# Patient Record
Sex: MALE | Race: WHITE | NOT HISPANIC OR LATINO | URBAN - METROPOLITAN AREA
[De-identification: names, ages, dates, MRNs, and addresses within clinical notes are randomized per-mention and may not be internally consistent; named-entity substitution may affect disease eponyms.]

---

## 2017-01-23 ENCOUNTER — OUTPATIENT (OUTPATIENT)
Dept: OUTPATIENT SERVICES | Facility: HOSPITAL | Age: 27
LOS: 1 days | End: 2017-01-23

## 2017-01-23 ENCOUNTER — APPOINTMENT (OUTPATIENT)
Dept: NEUROLOGY | Facility: HOSPITAL | Age: 27
End: 2017-01-23

## 2017-01-23 VITALS
BODY MASS INDEX: 25.03 KG/M2 | HEIGHT: 69 IN | SYSTOLIC BLOOD PRESSURE: 123 MMHG | DIASTOLIC BLOOD PRESSURE: 77 MMHG | WEIGHT: 169 LBS | HEART RATE: 69 BPM

## 2017-01-25 DIAGNOSIS — R56.9 UNSPECIFIED CONVULSIONS: ICD-10-CM

## 2017-10-03 ENCOUNTER — MEDICATION RENEWAL (OUTPATIENT)
Age: 27
End: 2017-10-03

## 2017-10-09 ENCOUNTER — APPOINTMENT (OUTPATIENT)
Dept: NEUROLOGY | Facility: HOSPITAL | Age: 27
End: 2017-10-09

## 2017-10-09 ENCOUNTER — OUTPATIENT (OUTPATIENT)
Dept: OUTPATIENT SERVICES | Facility: HOSPITAL | Age: 27
LOS: 1 days | End: 2017-10-09

## 2017-10-09 VITALS
BODY MASS INDEX: 24.59 KG/M2 | SYSTOLIC BLOOD PRESSURE: 123 MMHG | HEART RATE: 65 BPM | HEIGHT: 69 IN | DIASTOLIC BLOOD PRESSURE: 80 MMHG | WEIGHT: 166 LBS

## 2017-10-10 DIAGNOSIS — G40.909 EPILEPSY, UNSPECIFIED, NOT INTRACTABLE, WITHOUT STATUS EPILEPTICUS: ICD-10-CM

## 2017-10-10 DIAGNOSIS — R56.9 UNSPECIFIED CONVULSIONS: ICD-10-CM

## 2018-04-09 ENCOUNTER — LABORATORY RESULT (OUTPATIENT)
Age: 28
End: 2018-04-09

## 2018-04-09 ENCOUNTER — APPOINTMENT (OUTPATIENT)
Dept: NEUROLOGY | Facility: HOSPITAL | Age: 28
End: 2018-04-09

## 2018-04-09 ENCOUNTER — OUTPATIENT (OUTPATIENT)
Dept: OUTPATIENT SERVICES | Facility: HOSPITAL | Age: 28
LOS: 1 days | End: 2018-04-09

## 2018-04-09 VITALS
HEART RATE: 78 BPM | HEIGHT: 69 IN | DIASTOLIC BLOOD PRESSURE: 64 MMHG | WEIGHT: 161 LBS | SYSTOLIC BLOOD PRESSURE: 112 MMHG | BODY MASS INDEX: 23.85 KG/M2

## 2018-04-09 LAB
ALBUMIN SERPL ELPH-MCNC: 4.2 G/DL — SIGNIFICANT CHANGE UP (ref 3.3–5)
ALP SERPL-CCNC: 45 U/L — SIGNIFICANT CHANGE UP (ref 40–120)
ALT FLD-CCNC: 13 U/L — SIGNIFICANT CHANGE UP (ref 4–41)
AST SERPL-CCNC: 20 U/L — SIGNIFICANT CHANGE UP (ref 4–40)
BASOPHILS # BLD AUTO: 0.02 K/UL — SIGNIFICANT CHANGE UP (ref 0–0.2)
BASOPHILS NFR BLD AUTO: 0.3 % — SIGNIFICANT CHANGE UP (ref 0–2)
BILIRUB DIRECT SERPL-MCNC: 0.2 MG/DL — SIGNIFICANT CHANGE UP (ref 0.1–0.2)
BILIRUB SERPL-MCNC: 0.7 MG/DL — SIGNIFICANT CHANGE UP (ref 0.2–1.2)
BUN SERPL-MCNC: 17 MG/DL — SIGNIFICANT CHANGE UP (ref 7–23)
CALCIUM SERPL-MCNC: 9.5 MG/DL — SIGNIFICANT CHANGE UP (ref 8.4–10.5)
CHLORIDE SERPL-SCNC: 103 MMOL/L — SIGNIFICANT CHANGE UP (ref 98–107)
CO2 SERPL-SCNC: 27 MMOL/L — SIGNIFICANT CHANGE UP (ref 22–31)
CREAT SERPL-MCNC: 1.24 MG/DL — SIGNIFICANT CHANGE UP (ref 0.5–1.3)
EOSINOPHIL # BLD AUTO: 0.19 K/UL — SIGNIFICANT CHANGE UP (ref 0–0.5)
EOSINOPHIL NFR BLD AUTO: 2.6 % — SIGNIFICANT CHANGE UP (ref 0–6)
GLUCOSE SERPL-MCNC: 73 MG/DL — SIGNIFICANT CHANGE UP (ref 70–99)
HCT VFR BLD CALC: 49.4 % — SIGNIFICANT CHANGE UP (ref 39–50)
HGB BLD-MCNC: 15.8 G/DL — SIGNIFICANT CHANGE UP (ref 13–17)
IMM GRANULOCYTES # BLD AUTO: 0.03 # — SIGNIFICANT CHANGE UP
IMM GRANULOCYTES NFR BLD AUTO: 0.4 % — SIGNIFICANT CHANGE UP (ref 0–1.5)
LYMPHOCYTES # BLD AUTO: 1.59 K/UL — SIGNIFICANT CHANGE UP (ref 1–3.3)
LYMPHOCYTES # BLD AUTO: 21.7 % — SIGNIFICANT CHANGE UP (ref 13–44)
MCHC RBC-ENTMCNC: 28.8 PG — SIGNIFICANT CHANGE UP (ref 27–34)
MCHC RBC-ENTMCNC: 32 % — SIGNIFICANT CHANGE UP (ref 32–36)
MCV RBC AUTO: 90.1 FL — SIGNIFICANT CHANGE UP (ref 80–100)
MONOCYTES # BLD AUTO: 0.55 K/UL — SIGNIFICANT CHANGE UP (ref 0–0.9)
MONOCYTES NFR BLD AUTO: 7.5 % — SIGNIFICANT CHANGE UP (ref 2–14)
NEUTROPHILS # BLD AUTO: 4.95 K/UL — SIGNIFICANT CHANGE UP (ref 1.8–7.4)
NEUTROPHILS NFR BLD AUTO: 67.5 % — SIGNIFICANT CHANGE UP (ref 43–77)
NRBC # FLD: 0 — SIGNIFICANT CHANGE UP
PLATELET # BLD AUTO: 207 K/UL — SIGNIFICANT CHANGE UP (ref 150–400)
PMV BLD: 11.1 FL — SIGNIFICANT CHANGE UP (ref 7–13)
POTASSIUM SERPL-MCNC: 4.7 MMOL/L — SIGNIFICANT CHANGE UP (ref 3.5–5.3)
POTASSIUM SERPL-SCNC: 4.7 MMOL/L — SIGNIFICANT CHANGE UP (ref 3.5–5.3)
PROT SERPL-MCNC: 6.8 G/DL — SIGNIFICANT CHANGE UP (ref 6–8.3)
RBC # BLD: 5.48 M/UL — SIGNIFICANT CHANGE UP (ref 4.2–5.8)
RBC # FLD: 12.6 % — SIGNIFICANT CHANGE UP (ref 10.3–14.5)
SODIUM SERPL-SCNC: 144 MMOL/L — SIGNIFICANT CHANGE UP (ref 135–145)
WBC # BLD: 7.33 K/UL — SIGNIFICANT CHANGE UP (ref 3.8–10.5)
WBC # FLD AUTO: 7.33 K/UL — SIGNIFICANT CHANGE UP (ref 3.8–10.5)

## 2018-04-10 DIAGNOSIS — G40.209 LOCALIZATION-RELATED (FOCAL) (PARTIAL) SYMPTOMATIC EPILEPSY AND EPILEPTIC SYNDROMES WITH COMPLEX PARTIAL SEIZURES, NOT INTRACTABLE, WITHOUT STATUS EPILEPTICUS: ICD-10-CM

## 2018-10-15 ENCOUNTER — OUTPATIENT (OUTPATIENT)
Dept: OUTPATIENT SERVICES | Facility: HOSPITAL | Age: 28
LOS: 1 days | End: 2018-10-15

## 2018-10-15 ENCOUNTER — APPOINTMENT (OUTPATIENT)
Dept: NEUROLOGY | Facility: HOSPITAL | Age: 28
End: 2018-10-15

## 2018-10-15 VITALS
DIASTOLIC BLOOD PRESSURE: 75 MMHG | HEIGHT: 69 IN | HEART RATE: 67 BPM | SYSTOLIC BLOOD PRESSURE: 125 MMHG | WEIGHT: 157 LBS | BODY MASS INDEX: 23.25 KG/M2

## 2018-10-15 DIAGNOSIS — R56.9 UNSPECIFIED CONVULSIONS: ICD-10-CM

## 2018-10-16 DIAGNOSIS — R56.9 UNSPECIFIED CONVULSIONS: ICD-10-CM

## 2018-11-13 ENCOUNTER — APPOINTMENT (OUTPATIENT)
Dept: INTERNAL MEDICINE | Facility: CLINIC | Age: 28
End: 2018-11-13

## 2018-12-16 ENCOUNTER — FORM ENCOUNTER (OUTPATIENT)
Age: 28
End: 2018-12-16

## 2018-12-17 ENCOUNTER — OUTPATIENT (OUTPATIENT)
Dept: OUTPATIENT SERVICES | Facility: HOSPITAL | Age: 28
LOS: 1 days | End: 2018-12-17
Payer: COMMERCIAL

## 2018-12-17 ENCOUNTER — APPOINTMENT (OUTPATIENT)
Dept: ULTRASOUND IMAGING | Facility: IMAGING CENTER | Age: 28
End: 2018-12-17
Payer: COMMERCIAL

## 2018-12-17 ENCOUNTER — APPOINTMENT (OUTPATIENT)
Dept: UROLOGY | Facility: CLINIC | Age: 28
End: 2018-12-17
Payer: COMMERCIAL

## 2018-12-17 ENCOUNTER — OTHER (OUTPATIENT)
Age: 28
End: 2018-12-17

## 2018-12-17 DIAGNOSIS — R39.9 UNSPECIFIED SYMPTOMS AND SIGNS INVOLVING THE GENITOURINARY SYSTEM: ICD-10-CM

## 2018-12-17 DIAGNOSIS — N50.9 DISORDER OF MALE GENITAL ORGANS, UNSPECIFIED: ICD-10-CM

## 2018-12-17 PROCEDURE — 76870 US EXAM SCROTUM: CPT | Mod: 26

## 2018-12-17 PROCEDURE — 76870 US EXAM SCROTUM: CPT

## 2018-12-17 PROCEDURE — 99204 OFFICE O/P NEW MOD 45 MIN: CPT

## 2018-12-19 LAB
APPEARANCE: CLEAR
BACTERIA UR CULT: NORMAL
BACTERIA: NEGATIVE
BILIRUBIN URINE: NEGATIVE
BLOOD URINE: NEGATIVE
COLOR: YELLOW
GLUCOSE QUALITATIVE U: NEGATIVE MG/DL
HYALINE CASTS: 0 /LPF
KETONES URINE: NEGATIVE
LEUKOCYTE ESTERASE URINE: NEGATIVE
MICROSCOPIC-UA: NORMAL
NITRITE URINE: NEGATIVE
PH URINE: 6.5
PROTEIN URINE: NEGATIVE MG/DL
RED BLOOD CELLS URINE: 0 /HPF
SPECIFIC GRAVITY URINE: 1.01
SQUAMOUS EPITHELIAL CELLS: 0 /HPF
UROBILINOGEN URINE: NEGATIVE MG/DL
WHITE BLOOD CELLS URINE: 0 /HPF

## 2019-06-05 ENCOUNTER — RX RENEWAL (OUTPATIENT)
Age: 29
End: 2019-06-05

## 2019-08-01 ENCOUNTER — OUTPATIENT (OUTPATIENT)
Dept: OUTPATIENT SERVICES | Facility: HOSPITAL | Age: 29
LOS: 1 days | End: 2019-08-01
Payer: MEDICAID

## 2019-08-01 ENCOUNTER — APPOINTMENT (OUTPATIENT)
Dept: NEUROLOGY | Facility: HOSPITAL | Age: 29
End: 2019-08-01

## 2019-08-01 VITALS
HEIGHT: 69 IN | SYSTOLIC BLOOD PRESSURE: 114 MMHG | WEIGHT: 165 LBS | RESPIRATION RATE: 14 BRPM | BODY MASS INDEX: 24.44 KG/M2 | DIASTOLIC BLOOD PRESSURE: 70 MMHG | HEART RATE: 62 BPM

## 2019-08-01 DIAGNOSIS — G40.909 EPILEPSY, UNSPECIFIED, NOT INTRACTABLE, WITHOUT STATUS EPILEPTICUS: ICD-10-CM

## 2019-08-01 DIAGNOSIS — R56.9 UNSPECIFIED CONVULSIONS: ICD-10-CM

## 2019-08-01 LAB
ANION GAP SERPL CALC-SCNC: 13 MMOL/L — SIGNIFICANT CHANGE UP (ref 5–17)
BASOPHILS # BLD AUTO: 0.04 K/UL — SIGNIFICANT CHANGE UP (ref 0–0.2)
BASOPHILS NFR BLD AUTO: 0.5 % — SIGNIFICANT CHANGE UP (ref 0–2)
BUN SERPL-MCNC: 17 MG/DL — SIGNIFICANT CHANGE UP (ref 7–23)
CALCIUM SERPL-MCNC: 10.2 MG/DL — SIGNIFICANT CHANGE UP (ref 8.4–10.5)
CHLORIDE SERPL-SCNC: 104 MMOL/L — SIGNIFICANT CHANGE UP (ref 96–108)
CO2 SERPL-SCNC: 29 MMOL/L — SIGNIFICANT CHANGE UP (ref 22–31)
CREAT SERPL-MCNC: 1.31 MG/DL — HIGH (ref 0.5–1.3)
EOSINOPHIL # BLD AUTO: 0.15 K/UL — SIGNIFICANT CHANGE UP (ref 0–0.5)
EOSINOPHIL NFR BLD AUTO: 1.9 % — SIGNIFICANT CHANGE UP (ref 0–6)
GLUCOSE SERPL-MCNC: 78 MG/DL — SIGNIFICANT CHANGE UP (ref 70–99)
HCT VFR BLD CALC: 46.8 % — SIGNIFICANT CHANGE UP (ref 39–50)
HGB BLD-MCNC: 15.8 G/DL — SIGNIFICANT CHANGE UP (ref 13–17)
IMM GRANULOCYTES NFR BLD AUTO: 0.4 % — SIGNIFICANT CHANGE UP (ref 0–1.5)
LYMPHOCYTES # BLD AUTO: 2.48 K/UL — SIGNIFICANT CHANGE UP (ref 1–3.3)
LYMPHOCYTES # BLD AUTO: 31.8 % — SIGNIFICANT CHANGE UP (ref 13–44)
MCHC RBC-ENTMCNC: 30 PG — SIGNIFICANT CHANGE UP (ref 27–34)
MCHC RBC-ENTMCNC: 33.8 GM/DL — SIGNIFICANT CHANGE UP (ref 32–36)
MCV RBC AUTO: 88.8 FL — SIGNIFICANT CHANGE UP (ref 80–100)
MONOCYTES # BLD AUTO: 0.68 K/UL — SIGNIFICANT CHANGE UP (ref 0–0.9)
MONOCYTES NFR BLD AUTO: 8.7 % — SIGNIFICANT CHANGE UP (ref 2–14)
NEUTROPHILS # BLD AUTO: 4.41 K/UL — SIGNIFICANT CHANGE UP (ref 1.8–7.4)
NEUTROPHILS NFR BLD AUTO: 56.7 % — SIGNIFICANT CHANGE UP (ref 43–77)
PLATELET # BLD AUTO: 217 K/UL — SIGNIFICANT CHANGE UP (ref 150–400)
POTASSIUM SERPL-MCNC: 4.8 MMOL/L — SIGNIFICANT CHANGE UP (ref 3.5–5.3)
POTASSIUM SERPL-SCNC: 4.8 MMOL/L — SIGNIFICANT CHANGE UP (ref 3.5–5.3)
RBC # BLD: 5.27 M/UL — SIGNIFICANT CHANGE UP (ref 4.2–5.8)
RBC # FLD: 12.7 % — SIGNIFICANT CHANGE UP (ref 10.3–14.5)
SODIUM SERPL-SCNC: 146 MMOL/L — HIGH (ref 135–145)
WBC # BLD: 7.79 K/UL — SIGNIFICANT CHANGE UP (ref 3.8–10.5)
WBC # FLD AUTO: 7.79 K/UL — SIGNIFICANT CHANGE UP (ref 3.8–10.5)

## 2019-08-01 PROCEDURE — 80177 DRUG SCRN QUAN LEVETIRACETAM: CPT

## 2019-08-01 PROCEDURE — 80048 BASIC METABOLIC PNL TOTAL CA: CPT

## 2019-08-01 PROCEDURE — G0463: CPT

## 2019-08-01 PROCEDURE — 36415 COLL VENOUS BLD VENIPUNCTURE: CPT

## 2019-08-01 NOTE — HISTORY OF PRESENT ILLNESS
[FreeTextEntry1] : 28 yo male presents for f/u epilepsy clinic. Patient last seen 6 months ago. Today reports he is doing well. No complaints. Complies with medication. Doing well on Keppra 1500mg po bid. Patient driving, working. Seizure precautions discussed. \par \par Last GTC was reported to be in . \par \par Handedness: The patient is right handed. \par Age of Onset: 24-25 yrs of age. \par Seizure / Event Types: Generalized convulsions. Tongue biting with incontinence. \par History of Status Epilepticus: no. \par Epilepsy Risk Factors: no  complications, no febrile seizures, no developmental delay, no head trauma, no meningitis or encephalitis, no stroke and no family history of seizures \par Past treatment has included Only Keppra\par \par Previous hx:\par 27yr M with history of 3 GTC in total- 2 in 2014, followed by 1 in 2014. He underwent an inpatient VEEG which revealed only mild generalized slowing and an MRI Brain w/wo MRA head and neck both in  and  which reveal a subcentimeter area in right frontal lobe that may be a DVA vs Cavernoma. He has been tolerating Keppra 1g BID and has not had an event over 2 years. However in 2016 he reported a GTC episodes preceded by his "right eye spinning around". Keppra increased to 1500 mg BID\par \par MRI Brain showed blush of stable enhancement in right frontal lesion with hemosiderin deposition. \par Routine EEG showed ?left temporal theta activity.

## 2019-08-01 NOTE — DISCUSSION/SUMMARY
[FreeTextEntry1] : 30 yo male with GTCs, ?likely focal epilepsy. ( first seizure 2014)\par EEG with ? 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

## 2019-08-01 NOTE — PHYSICAL EXAM
[FreeTextEntry1] : General: NAD, normal appearance, healthy appearing, pleasant, cooperative, alert\par \par Neuro: \par MS: AA0x3, follows commands, fluent speech, no dysarthria, no neglect.\par CN: PERRL, EOMI, VFF, V1-V3 intact, no facial asymmetry, uvula/tongue midline, SCM's/traps intact.\par Motor: 5/5 all, no drifts\par Sensory: Intact LT all\par Reflexes: DTR's all 2+ and symmetric\par Coordination: No dysmetria FTN b/l\par Gait: intact and steady

## 2019-08-03 LAB — LEVETIRACETAM SERPL-MCNC: 34.2 MCG/ML — SIGNIFICANT CHANGE UP (ref 12–46)

## 2019-09-16 ENCOUNTER — OUTPATIENT (OUTPATIENT)
Dept: OUTPATIENT SERVICES | Facility: HOSPITAL | Age: 29
LOS: 1 days | End: 2019-09-16
Payer: MEDICAID

## 2019-09-16 ENCOUNTER — APPOINTMENT (OUTPATIENT)
Dept: UROLOGY | Facility: HOSPITAL | Age: 29
End: 2019-09-16

## 2019-09-16 VITALS
DIASTOLIC BLOOD PRESSURE: 83 MMHG | BODY MASS INDEX: 24.59 KG/M2 | RESPIRATION RATE: 14 BRPM | HEART RATE: 69 BPM | HEIGHT: 69 IN | WEIGHT: 166 LBS | SYSTOLIC BLOOD PRESSURE: 121 MMHG

## 2019-09-16 DIAGNOSIS — N52.9 MALE ERECTILE DYSFUNCTION, UNSPECIFIED: ICD-10-CM

## 2019-09-16 DIAGNOSIS — R30.0 DYSURIA: ICD-10-CM

## 2019-09-16 PROCEDURE — G0463: CPT

## 2019-09-16 RX ORDER — TADALAFIL 20 MG/1
20 TABLET ORAL
Qty: 30 | Refills: 0 | Status: DISCONTINUED | COMMUNITY
Start: 2019-09-16 | End: 2019-09-16

## 2019-09-16 NOTE — PHYSICAL EXAM
[General Appearance - Well Developed] : well developed [General Appearance - Well Nourished] : well nourished [Well Groomed] : well groomed [Normal Appearance] : normal appearance [General Appearance - In No Acute Distress] : no acute distress [Abdomen Soft] : soft [Abdomen Tenderness] : non-tender [Costovertebral Angle Tenderness] : no ~M costovertebral angle tenderness [Penis Abnormality] : normal uncircumcised penis [Urinary Bladder Findings] : the bladder was normal on palpation [Scrotum] : the scrotum was normal [Testes Mass (___cm)] : there were no testicular masses [No Prostate Nodules] : no prostate nodules [Edema] : no peripheral edema [Respiration, Rhythm And Depth] : normal respiratory rhythm and effort [] : no respiratory distress [Exaggerated Use Of Accessory Muscles For Inspiration] : no accessory muscle use [Oriented To Time, Place, And Person] : oriented to person, place, and time [Affect] : the affect was normal [Mood] : the mood was normal [Not Anxious] : not anxious [Normal Station and Gait] : the gait and station were normal for the patient's age [No Focal Deficits] : no focal deficits [No Palpable Adenopathy] : no palpable adenopathy

## 2019-09-16 NOTE — HISTORY OF PRESENT ILLNESS
[Erectile Dysfunction] : Erectile Dysfunction [FreeTextEntry1] : 29 year old male with history of ED for follow up.  He has been worked up in the past. Last testosterone 850.  He states he often cannot stay fully erect.  He has taken Cialis in the past which he states has worked well, and he would like a new Rx.  Denies anxiety or depression.  He has a history of seizure disorder and he takes Keppra.  He is sexually active with one partner, no history of STDs.  He underwent a hydrocelectomy in the past, denies any testicular pain.

## 2019-12-09 ENCOUNTER — APPOINTMENT (OUTPATIENT)
Dept: UROLOGY | Facility: HOSPITAL | Age: 29
End: 2019-12-09

## 2019-12-16 ENCOUNTER — APPOINTMENT (OUTPATIENT)
Dept: UROLOGY | Facility: HOSPITAL | Age: 29
End: 2019-12-16

## 2019-12-16 ENCOUNTER — OUTPATIENT (OUTPATIENT)
Dept: OUTPATIENT SERVICES | Facility: HOSPITAL | Age: 29
LOS: 1 days | End: 2019-12-16
Payer: MEDICAID

## 2019-12-16 VITALS
SYSTOLIC BLOOD PRESSURE: 118 MMHG | BODY MASS INDEX: 24.44 KG/M2 | WEIGHT: 165 LBS | RESPIRATION RATE: 14 BRPM | HEART RATE: 72 BPM | HEIGHT: 69 IN | DIASTOLIC BLOOD PRESSURE: 74 MMHG

## 2019-12-16 DIAGNOSIS — R30.0 DYSURIA: ICD-10-CM

## 2019-12-16 LAB
APPEARANCE UR: CLEAR — SIGNIFICANT CHANGE UP
BACTERIA # UR AUTO: NEGATIVE — SIGNIFICANT CHANGE UP
BILIRUB UR-MCNC: NEGATIVE — SIGNIFICANT CHANGE UP
COLOR SPEC: YELLOW — SIGNIFICANT CHANGE UP
DIFF PNL FLD: NEGATIVE — SIGNIFICANT CHANGE UP
EPI CELLS # UR: 0 /HPF — SIGNIFICANT CHANGE UP (ref 0–5)
GLUCOSE UR QL: NEGATIVE — SIGNIFICANT CHANGE UP
HYALINE CASTS # UR AUTO: 0 /LPF — SIGNIFICANT CHANGE UP (ref 0–7)
KETONES UR-MCNC: NEGATIVE — SIGNIFICANT CHANGE UP
LEUKOCYTE ESTERASE UR-ACNC: NEGATIVE — SIGNIFICANT CHANGE UP
NITRITE UR-MCNC: NEGATIVE — SIGNIFICANT CHANGE UP
PH UR: 6 — SIGNIFICANT CHANGE UP (ref 5–8)
PROT UR-MCNC: SIGNIFICANT CHANGE UP
RBC CASTS # UR COMP ASSIST: 3 /HPF — SIGNIFICANT CHANGE UP (ref 0–4)
SP GR SPEC: 1.03 — HIGH (ref 1.01–1.02)
UROBILINOGEN FLD QL: SIGNIFICANT CHANGE UP
WBC UR QL: 1 /HPF — SIGNIFICANT CHANGE UP (ref 0–5)

## 2019-12-16 PROCEDURE — G0463: CPT

## 2019-12-16 PROCEDURE — 87086 URINE CULTURE/COLONY COUNT: CPT

## 2019-12-16 PROCEDURE — 81001 URINALYSIS AUTO W/SCOPE: CPT

## 2019-12-16 NOTE — PHYSICAL EXAM
[General Appearance - Well Developed] : well developed [General Appearance - Well Nourished] : well nourished [Well Groomed] : well groomed [Normal Appearance] : normal appearance [General Appearance - In No Acute Distress] : no acute distress [Abdomen Soft] : soft [Abdomen Tenderness] : non-tender [Costovertebral Angle Tenderness] : no ~M costovertebral angle tenderness [Penis Abnormality] : normal uncircumcised penis [Scrotum] : the scrotum was normal [Urinary Bladder Findings] : the bladder was normal on palpation [No Prostate Nodules] : no prostate nodules [Testes Mass (___cm)] : there were no testicular masses [Edema] : no peripheral edema [] : no respiratory distress [Respiration, Rhythm And Depth] : normal respiratory rhythm and effort [Exaggerated Use Of Accessory Muscles For Inspiration] : no accessory muscle use [Oriented To Time, Place, And Person] : oriented to person, place, and time [Affect] : the affect was normal [Mood] : the mood was normal [Not Anxious] : not anxious [Normal Station and Gait] : the gait and station were normal for the patient's age [No Focal Deficits] : no focal deficits [No Palpable Adenopathy] : no palpable adenopathy

## 2019-12-17 ENCOUNTER — APPOINTMENT (OUTPATIENT)
Dept: NEUROLOGY | Facility: HOSPITAL | Age: 29
End: 2019-12-17

## 2019-12-17 ENCOUNTER — OUTPATIENT (OUTPATIENT)
Dept: OUTPATIENT SERVICES | Facility: HOSPITAL | Age: 29
LOS: 1 days | End: 2019-12-17
Payer: MEDICAID

## 2019-12-17 VITALS
SYSTOLIC BLOOD PRESSURE: 116 MMHG | RESPIRATION RATE: 16 BRPM | HEIGHT: 69 IN | BODY MASS INDEX: 24.44 KG/M2 | DIASTOLIC BLOOD PRESSURE: 78 MMHG | HEART RATE: 74 BPM | WEIGHT: 165 LBS

## 2019-12-17 DIAGNOSIS — R35.0 FREQUENCY OF MICTURITION: ICD-10-CM

## 2019-12-17 DIAGNOSIS — N52.9 MALE ERECTILE DYSFUNCTION, UNSPECIFIED: ICD-10-CM

## 2019-12-17 DIAGNOSIS — R56.9 UNSPECIFIED CONVULSIONS: ICD-10-CM

## 2019-12-17 DIAGNOSIS — R51 HEADACHE: ICD-10-CM

## 2019-12-17 LAB
CULTURE RESULTS: NO GROWTH — SIGNIFICANT CHANGE UP
SPECIMEN SOURCE: SIGNIFICANT CHANGE UP

## 2019-12-17 PROCEDURE — G0463: CPT

## 2019-12-17 NOTE — PHYSICAL EXAM
[FreeTextEntry1] : General: NAD, normal appearance, healthy appearing, pleasant, cooperative, alert\par \par Neuro: \par MS: AA0x3, follows commands, fluent speech, no dysarthria, no neglect.\par CN: PERRL, EOMI, VFF, V1-V3 intact, no facial asymmetry, uvula/tongue midline, SCM's/traps intact.\par Motor: 5/5 all, no drifts\par Sensory: Intact LT all\par Reflexes: DTR's all 2+ and symmetric\par Coordination: No dysmetria FTN b/l\par Gait: intact and steady.

## 2019-12-17 NOTE — DISCUSSION/SUMMARY
[FreeTextEntry1] : 30 yo male with GTCs, ?likely focal epilepsy. ( first seizure 2014)\par EEG with ? 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

## 2019-12-17 NOTE — HISTORY OF PRESENT ILLNESS
[FreeTextEntry1] : 30 yo male presents for f/u epilepsy clinic. Patient last seen 6 months ago. Today reports he is doing well. No complaints. Complies with medication. Doing well on Keppra 1500mg po bid. Patient driving, working. Seizure precautions discussed. \par \par Last GTC was reported to be in . \par \par Handedness: The patient is right handed. \par Age of Onset: 24-25 yrs of age. \par Seizure / Event Types: Generalized convulsions. Tongue biting with incontinence. \par History of Status Epilepticus: no. \par Epilepsy Risk Factors: no  complications, no febrile seizures, no developmental delay, no head trauma, no meningitis or encephalitis, no stroke and no family history of seizures \par Past treatment has included Only Keppra\par \par Previous hx:\par 27yr M with history of 3 GTC in total- 2 in 2014, followed by 1 in 2014. He underwent an inpatient VEEG which revealed only mild generalized slowing and an MRI Brain w/wo MRA head and neck both in  and  which reveal a subcentimeter area in right frontal lobe that may be a DVA vs Cavernoma. He has been tolerating Keppra 1g BID and has not had an event over 2 years. However in 2016 he reported a GTC episodes preceded by his "right eye spinning around". Keppra increased to 1500 mg BID\par \par MRI Brain showed blush of stable enhancement in right frontal lesion with hemosiderin deposition. \par Routine EEG showed ?left temporal theta activity.

## 2019-12-23 NOTE — HISTORY OF PRESENT ILLNESS
[Urinary Urgency] : urinary urgency [Urinary Frequency] : urinary frequency [Nocturia] : nocturia [Erectile Dysfunction] : Erectile Dysfunction [FreeTextEntry1] : 29 year old male with history of ED for follow up.  He has been worked up in the past. Last testosterone 850.  He states he often cannot stay fully erect.  He has taken Cialis in the past which he states has worked well, and he would like a new Rx.  Denies anxiety or depression.  He has a history of seizure disorder and he takes Keppra.  He is sexually active with one partner, no history of STDs.  He underwent a hydrocelectomy in the past, denies any testicular pain. \par \par Interval Events:\par Cialis has been working well.  Takes it daily as needed and assists with performance.  Erections 10/10 and adequate for intercourse.  Has been having some urinary urgency that he's noted for years.  Hasn't worsened but would like to at least address it today.\par \par Drinks a couple cups of coffee in the afternoon.  Wakes up in AM and has to urinate multiple times.  No weak stream, incomplete emptying, straining, hematuria.  Doesn't notice it change with amount of liquids. [Urinary Incontinence] : no urinary incontinence [Urinary Retention] : no urinary retention [Straining] : no straining [Weak Stream] : no weak stream [Intermittency] : no intermittency [Post-Void Dribbling] : no post-void dribbling [Dysuria] : no dysuria [Hematuria - Gross] : no gross hematuria [Fever] : no fever [Nausea] : no nausea [Fatigue] : no fatigue

## 2019-12-23 NOTE — ASSESSMENT
[FreeTextEntry1] : 29 year old male with PMHx of Epilepsy w/ a recent MRI brain showing a blush of stable enhancement in his right frontal lobe with some hemosiderin deposits presents for f/u of ED and Urinary Frequency.  ED is well controlled with cialis currently.  Urinary frequency is likely 2/2 to increase caffeine intake vs. cerebral insult causing OAB.  \par \par - Instructed to decrease caffeine overall, no longer in the afternoon, decrease fluids near bedtime\par - Rx for Cialis 20mg, renewed for 6 mo\par - f/u 3 months for symptom assessment

## 2020-03-09 ENCOUNTER — APPOINTMENT (OUTPATIENT)
Dept: UROLOGY | Facility: HOSPITAL | Age: 30
End: 2020-03-09

## 2020-08-24 ENCOUNTER — RX RENEWAL (OUTPATIENT)
Age: 30
End: 2020-08-24

## 2020-11-23 ENCOUNTER — APPOINTMENT (OUTPATIENT)
Dept: UROLOGY | Facility: HOSPITAL | Age: 30
End: 2020-11-23

## 2020-12-17 ENCOUNTER — RX RENEWAL (OUTPATIENT)
Age: 30
End: 2020-12-17

## 2021-06-03 ENCOUNTER — APPOINTMENT (OUTPATIENT)
Dept: UROLOGY | Facility: CLINIC | Age: 31
End: 2021-06-03
Payer: COMMERCIAL

## 2021-06-03 VITALS
WEIGHT: 165 LBS | SYSTOLIC BLOOD PRESSURE: 125 MMHG | TEMPERATURE: 97.8 F | DIASTOLIC BLOOD PRESSURE: 78 MMHG | RESPIRATION RATE: 16 BRPM | BODY MASS INDEX: 24.44 KG/M2 | HEIGHT: 69 IN | HEART RATE: 67 BPM

## 2021-06-03 PROCEDURE — 99214 OFFICE O/P EST MOD 30 MIN: CPT

## 2021-06-03 NOTE — PHYSICAL EXAM
[General Appearance - Well Developed] : well developed [Normal Appearance] : normal appearance [General Appearance - In No Acute Distress] : no acute distress [Abdomen Soft] : soft [Abdomen Tenderness] : non-tender [Testes Tenderness] : no tenderness of the testes [Edema] : no peripheral edema [] : no respiratory distress [Respiration, Rhythm And Depth] : normal respiratory rhythm and effort [Exaggerated Use Of Accessory Muscles For Inspiration] : no accessory muscle use [Oriented To Time, Place, And Person] : oriented to person, place, and time [Affect] : the affect was normal [Mood] : the mood was normal [Not Anxious] : not anxious [Normal Station and Gait] : the gait and station were normal for the patient's age [No Focal Deficits] : no focal deficits

## 2021-06-04 NOTE — ADDENDUM
[FreeTextEntry1] : I, Vicki Deanin, acted solely as a scribe for Dr. Clay Vargas on this date 06/03/2021.\par \par All medical record entries made by the Scribe were at my, Dr. Clay Vargas, direction and personally dictated by me on 06/03/2021. I have reviewed the chart and agree that the record accurately reflects my personal performance of the history, physical exam, assessment and plan.  I have also personally directed, reviewed and agreed with the chart.

## 2021-06-04 NOTE — REVIEW OF SYSTEMS
[Nocturia] : nocturia [Convulsions] : convulsions [Anxiety] : anxiety [Negative] : Heme/Lymph [Poor quality erections] : Poor quality erections [Feeling Poorly] : not feeling poorly [Feeling Tired] : not feeling tired [Eyesight Problems] : no eyesight problems [Loss Of Hearing] : no hearing loss [Nosebleeds] : no nosebleeds [Chest Pain] : no chest pain [Cough] : no cough [Wheezing] : no wheezing [Constipation] : no constipation [Diarrhea] : no diarrhea [Heartburn] : no heartburn [Dysuria] : no dysuria [Incontinence] : no incontinence [Testicular Pain] : no testicular pain [Depression] : no depression [Erectile Dysfunction] : no erectile dysfunction

## 2021-06-04 NOTE — ASSESSMENT
[FreeTextEntry1] : 12/17/2018: Mr. Hudson is a 31 year old male presenting with a potential right hydrocele that appeared about one week ago. Patient had a bilateral hydrocele operation in 2005. Since the surgery, patient didn't have any testicular pain. Wakes up 1-2 times during the night to urinate. Patient denies dysuria, gross hematuria, urgency, or incontinence. Notes that his sexual function is satisfactory. Takes LevETIRAcetam to control his seizures. Currently unemployed.\par \par 06/03/2021: Patient presents today for follow up. Has taken Cialis 20mg PRN for ED which works only sometimes. States that erections will get soft early. Additionally complains of urinary hesitancy. \par \par I prescribed the patient Tadalafil 5mg once daily for urinary symptoms and ED. I informed the patient that they may experience tingling of the skin, headache, warm flushed feeling, heartburn, backache, and on rare occasion, visual or hearing disturbances. Instructed to take one extra 5mg pill on days he will engage in intercourse. \par \par Informed the patient of getting Tadalafil at a lower cost using GoodRx at a [employer] or [employer]. \par \par The patient produced a urine sample which will be sent for urinalysis, urine cytology, and urine culture. \par \par RTO in 3 weeks for reassessment. \par \par Preparation, in-person office visit, and coordination of care took: 30 minutes

## 2021-06-04 NOTE — HISTORY OF PRESENT ILLNESS
[FreeTextEntry1] : 12/17/2018: Mr. Hudson is a 31 year old male presenting with a potential right hydrocele that appeared about one week ago. Patient had a bilateral hydrocele operation in 2005. Since the surgery, patient didn't have any testicular pain. Wakes up 1-2 times during the night to urinate. Patient denies dysuria, gross hematuria, urgency, or incontinence. Notes that his sexual function is satisfactory. Takes LevETIRAcetam to control his seizures. Currently unemployed.\par \par 06/03/2021: Patient presents today for follow up. Has taken Cialis 20mg PRN for ED which works only sometimes. States that erections will get soft early. Additionally complains of urinary hesitancy.

## 2021-06-05 LAB
APPEARANCE: CLEAR
BACTERIA UR CULT: NORMAL
BACTERIA: NEGATIVE
BILIRUBIN URINE: NEGATIVE
BLOOD URINE: NEGATIVE
COLOR: COLORLESS
GLUCOSE QUALITATIVE U: NEGATIVE
HYALINE CASTS: 0 /LPF
KETONES URINE: NEGATIVE
LEUKOCYTE ESTERASE URINE: NEGATIVE
MICROSCOPIC-UA: NORMAL
NITRITE URINE: NEGATIVE
PH URINE: 7
PROTEIN URINE: NEGATIVE
RED BLOOD CELLS URINE: 0 /HPF
SPECIFIC GRAVITY URINE: 1.01
SQUAMOUS EPITHELIAL CELLS: 0 /HPF
URINE CYTOLOGY: NORMAL
UROBILINOGEN URINE: NORMAL
WHITE BLOOD CELLS URINE: 0 /HPF

## 2021-06-22 ENCOUNTER — APPOINTMENT (OUTPATIENT)
Dept: UROLOGY | Facility: CLINIC | Age: 31
End: 2021-06-22
Payer: COMMERCIAL

## 2021-06-22 VITALS
HEART RATE: 74 BPM | RESPIRATION RATE: 17 BRPM | DIASTOLIC BLOOD PRESSURE: 69 MMHG | BODY MASS INDEX: 24.44 KG/M2 | SYSTOLIC BLOOD PRESSURE: 114 MMHG | HEIGHT: 69 IN | WEIGHT: 165 LBS | TEMPERATURE: 97.7 F

## 2021-06-22 PROCEDURE — 99214 OFFICE O/P EST MOD 30 MIN: CPT

## 2021-06-22 NOTE — REVIEW OF SYSTEMS
[Nocturia] : nocturia [Poor quality erections] : Poor quality erections [Anxiety] : anxiety [Convulsions] : convulsions [Negative] : Heme/Lymph [Feeling Poorly] : not feeling poorly [Feeling Tired] : not feeling tired [Eyesight Problems] : no eyesight problems [Loss Of Hearing] : no hearing loss [Nosebleeds] : no nosebleeds [Chest Pain] : no chest pain [Cough] : no cough [Wheezing] : no wheezing [Constipation] : no constipation [Diarrhea] : no diarrhea [Heartburn] : no heartburn [Dysuria] : no dysuria [Incontinence] : no incontinence [Testicular Pain] : no testicular pain [Depression] : no depression [Erectile Dysfunction] : no erectile dysfunction

## 2021-06-22 NOTE — ASSESSMENT
[FreeTextEntry1] : 12/17/2018: Mr. Hudson is a 31 year old male presenting with a potential right hydrocele that appeared about one week ago. Patient had a bilateral hydrocele operation in 2005. Since the surgery, patient didn't have any testicular pain. Wakes up 1-2 times during the night to urinate. Patient denies dysuria, gross hematuria, urgency, or incontinence. Notes that his sexual function is satisfactory. Takes LevETIRAcetam to control his seizures. Currently unemployed.\par \par 06/03/2021: Patient presents today for follow up. Has taken Cialis 20mg PRN for ED which works only sometimes. States that erections will get soft early. Additionally complains of urinary hesitancy. \par \par I prescribed the patient Tadalafil 5mg once daily for urinary symptoms and ED. I informed the patient that they may experience tingling of the skin, headache, warm flushed feeling, heartburn, backache, and on rare occasion, visual or hearing disturbances. Instructed to take one extra 5mg pill on days he will engage in intercourse. \par Informed the patient of getting Tadalafil at a lower cost using GoodRx at a [employer] or [employer]. \par The patient produced a urine sample which will be sent for urinalysis, urine cytology, and urine culture. \par RTO in 3 weeks for reassessment. \par \par 06/22/2021: Patient presents today for a follow up. Has taken tadalafil 5 mg once daily however he has also taken 20 mg in addition to the 5 mg on some days that he wants to have sex. He feels libido is increased since taking tadalafil but still decreased overall. He is currently sexually active with one partner who has noticed an improvement. Urination is still delayed but much improved. He states he used to wait about 1 minute before voiding and now he waits about 25 seconds. Admits to having one episode of premature ejaculation in the past. +PMHx of epilepsy. Denies constipation, chest pains, or any new aches and pains. \par \par I advised the patient to continue taking tadalafil 5 mg once daily and on the days he wants to have sex and needs more help with his erections, I instructed him to take an additional 5 mg tablet and not the 20 mg tablet. If that does not help, he can try an additional 10 mg on the day he wants to have sex but I informed him it should start building up in his system. I educated him on the possibility of priapism where the erection would last too long if he takes too much tadalafil and it would cause damage to the penis if he did not seek medical attention. \par \par The patient produced a urine sample which will be sent for urinalysis, urine cytology, and urine culture. \par \par Blood work today included CMP, CBC with diff, Androstenedione, dehydroepiandrosterone serum,  dehydroepiandrosterone- sulfate serum, dihydrotestosterone, FSH, LH, progesterone, prolactin, sex hormone binding globulin, and testosterone free and total. \par \par Patient will RTO in one month for reassessment. \par \par Preparation, in person office visit, and coordination of care: 32 minutes

## 2021-06-22 NOTE — HISTORY OF PRESENT ILLNESS
[FreeTextEntry1] : 12/17/2018: Mr. Hudson is a 31 year old male presenting with a potential right hydrocele that appeared about one week ago. Patient had a bilateral hydrocele operation in 2005. Since the surgery, patient didn't have any testicular pain. Wakes up 1-2 times during the night to urinate. Patient denies dysuria, gross hematuria, urgency, or incontinence. Notes that his sexual function is satisfactory. Takes LevETIRAcetam to control his seizures. Currently unemployed.\par \par 06/03/2021: Patient presents today for follow up. Has taken Cialis 20mg PRN for ED which works only sometimes. States that erections will get soft early. Additionally complains of urinary hesitancy. \par \par 06/22/2021: Patient presents today for a follow up. Has taken tadalafil 5 mg once daily however he has also taken 20 mg in addition to the 5 mg on some days that he wants to have sex. He feels libido is increased since taking tadalafil but still decreased overall. He is currently sexually active with one partner who has noticed an improvement. Urination is still delayed but much improved. He states he used to wait about 1 minute before voiding and now he waits about 25 seconds. Admits to having one episode of premature ejaculation in the past. +PMHx of epilepsy. Denies constipation, chest pains, or any new aches and pains.

## 2021-06-22 NOTE — ADDENDUM
[FreeTextEntry1] : This note was authored by Talia Ospina working as a scribe for Dr.Gary Vargas. I, Dr. Clay Vargas have reviewed the content of this note and confirm it is true and accurate. I personally performed the history and physical examination and made all the decisions 06/22/2021.

## 2021-07-03 LAB
ALBUMIN SERPL ELPH-MCNC: 4.6 G/DL
ALP BLD-CCNC: 59 U/L
ALT SERPL-CCNC: 15 U/L
ANDROST SERPL-MCNC: 146 NG/DL
ANDROSTANOLONE SERPL-MCNC: 64 NG/DL
ANION GAP SERPL CALC-SCNC: 12 MMOL/L
APPEARANCE: CLEAR
AST SERPL-CCNC: 26 U/L
BACTERIA UR CULT: NORMAL
BACTERIA: NEGATIVE
BASOPHILS # BLD AUTO: 0.06 K/UL
BASOPHILS NFR BLD AUTO: 0.8 %
BILIRUB SERPL-MCNC: 0.8 MG/DL
BILIRUBIN URINE: NEGATIVE
BLOOD URINE: NEGATIVE
BUN SERPL-MCNC: 19 MG/DL
CALCIUM SERPL-MCNC: 10 MG/DL
CHLORIDE SERPL-SCNC: 103 MMOL/L
CO2 SERPL-SCNC: 25 MMOL/L
COLOR: COLORLESS
CREAT SERPL-MCNC: 1.51 MG/DL
DHEA SERPL-MCNC: 681 NG/DL
DHEA-S SERPL-MCNC: 329 UG/DL
EOSINOPHIL # BLD AUTO: 0.34 K/UL
EOSINOPHIL NFR BLD AUTO: 4.3 %
FSH SERPL-MCNC: 2.7 IU/L
GLUCOSE QUALITATIVE U: NEGATIVE
GLUCOSE SERPL-MCNC: 93 MG/DL
HCT VFR BLD CALC: 47 %
HGB BLD-MCNC: 15.4 G/DL
HYALINE CASTS: 0 /LPF
IMM GRANULOCYTES NFR BLD AUTO: 0.5 %
KETONES URINE: NORMAL
LEUKOCYTE ESTERASE URINE: NEGATIVE
LH SERPL-ACNC: 3.4 IU/L
LYMPHOCYTES # BLD AUTO: 2.06 K/UL
LYMPHOCYTES NFR BLD AUTO: 26.1 %
MAN DIFF?: NORMAL
MCHC RBC-ENTMCNC: 29.8 PG
MCHC RBC-ENTMCNC: 32.8 GM/DL
MCV RBC AUTO: 91.1 FL
MICROSCOPIC-UA: NORMAL
MONOCYTES # BLD AUTO: 0.55 K/UL
MONOCYTES NFR BLD AUTO: 7 %
NEUTROPHILS # BLD AUTO: 4.83 K/UL
NEUTROPHILS NFR BLD AUTO: 61.3 %
NITRITE URINE: NEGATIVE
OSMOLALITY SERPL: 292 MOSMOL/KG
PH URINE: 6
PLATELET # BLD AUTO: 226 K/UL
POTASSIUM SERPL-SCNC: 4.5 MMOL/L
PROGEST SERPL-MCNC: 0.4 NG/ML
PROLACTIN SERPL-MCNC: 12.9 NG/ML
PROT SERPL-MCNC: 7 G/DL
PROTEIN URINE: NEGATIVE
RBC # BLD: 5.16 M/UL
RBC # FLD: 12.8 %
RED BLOOD CELLS URINE: 0 /HPF
SHBG SERPL-SCNC: 69.3 NMOL/L
SODIUM SERPL-SCNC: 140 MMOL/L
SPECIFIC GRAVITY URINE: 1.01
SQUAMOUS EPITHELIAL CELLS: 0 /HPF
TESTOST BND SERPL-MCNC: 13.5 PG/ML
TESTOSTERONE TOTAL S: 692 NG/DL
URINE CYTOLOGY: NORMAL
UROBILINOGEN URINE: NORMAL
WBC # FLD AUTO: 7.88 K/UL
WHITE BLOOD CELLS URINE: 0 /HPF

## 2021-07-10 LAB
ANION GAP SERPL CALC-SCNC: 14 MMOL/L
BUN SERPL-MCNC: 16 MG/DL
CALCIUM SERPL-MCNC: 10.1 MG/DL
CHLORIDE SERPL-SCNC: 106 MMOL/L
CO2 SERPL-SCNC: 20 MMOL/L
CREAT SERPL-MCNC: 1.26 MG/DL
GLUCOSE SERPL-MCNC: 96 MG/DL
POTASSIUM SERPL-SCNC: 4.4 MMOL/L
SODIUM SERPL-SCNC: 141 MMOL/L

## 2021-08-10 ENCOUNTER — APPOINTMENT (OUTPATIENT)
Dept: UROLOGY | Facility: CLINIC | Age: 31
End: 2021-08-10

## 2021-11-17 ENCOUNTER — APPOINTMENT (OUTPATIENT)
Dept: NEUROLOGY | Facility: CLINIC | Age: 31
End: 2021-11-17

## 2021-11-24 ENCOUNTER — APPOINTMENT (OUTPATIENT)
Dept: UROLOGY | Facility: CLINIC | Age: 31
End: 2021-11-24

## 2022-01-05 ENCOUNTER — APPOINTMENT (OUTPATIENT)
Dept: UROLOGY | Facility: CLINIC | Age: 32
End: 2022-01-05
Payer: COMMERCIAL

## 2022-01-05 PROCEDURE — 99214 OFFICE O/P EST MOD 30 MIN: CPT

## 2022-01-06 NOTE — ASSESSMENT
[FreeTextEntry1] : 12/17/2018: Mr. Hudson is a 31 year old male presenting with a potential right hydrocele that appeared about one week ago. Patient had a bilateral hydrocele operation in 2005. Since the surgery, patient didn't have any testicular pain. Wakes up 1-2 times during the night to urinate. Patient denies dysuria, gross hematuria, urgency, or incontinence. Notes that his sexual function is satisfactory. Takes LevETIRAcetam to control his seizures. Currently unemployed.\par \par 06/03/2021: Patient presents today for follow up. Has taken Cialis 20mg PRN for ED which works only sometimes. States that erections will get soft early. Additionally complains of urinary hesitancy. \par \par 06/22/2021: Patient presents today for a follow up. Has taken tadalafil 5 mg once daily however he has also taken 20 mg in addition to the 5 mg on some days that he wants to have sex. He feels libido is increased since taking tadalafil but still decreased overall. He is currently sexually active with one partner who has noticed an improvement. Urination is still delayed but much improved. He states he used to wait about 1 minute before voiding and now he waits about 25 seconds. Admits to having one episode of premature ejaculation in the past. +PMHx of epilepsy. Denies constipation, chest pains, or any new aches and pains. \par \par 01/05/2022: Patient presents today for follow up. Today complains of dry skin on his penis, cracking, and redness. No pain. Occured before any type of sexual activity. Has been using Lubriderm which has helped. Does eat meat and vegetable diet. Skin has been improving. Continues to take tadalafil 5mg once daily which has helped and has not needed to take additional tablets. Urination is good. Still has delayed urination but is better. \par \par I recommend patient moisturize at least 3x a day for dry skin. \par \par Continue tadalafil 5mg once daily for ED and urination. \par \par The patient produced a urine sample which will be sent for urinalysis, urine cytology, and urine culture. \par \par RTO in 1 year for follow up or sooner if new urinary symptoms develop. \par \par Preparation, in-person office visit, and coordination of care took 30 minutes

## 2022-01-06 NOTE — PHYSICAL EXAM
[General Appearance - Well Developed] : well developed [General Appearance - Well Nourished] : well nourished [Normal Appearance] : normal appearance [Well Groomed] : well groomed [General Appearance - In No Acute Distress] : no acute distress [Abdomen Soft] : soft [Abdomen Tenderness] : non-tender [Skin Color & Pigmentation] : normal skin color and pigmentation [Edema] : no peripheral edema [] : no respiratory distress [Respiration, Rhythm And Depth] : normal respiratory rhythm and effort [Exaggerated Use Of Accessory Muscles For Inspiration] : no accessory muscle use [Oriented To Time, Place, And Person] : oriented to person, place, and time [Affect] : the affect was normal [Mood] : the mood was normal [Not Anxious] : not anxious [Normal Station and Gait] : the gait and station were normal for the patient's age [No Focal Deficits] : no focal deficits [FreeTextEntry1] : penile skin looks normal

## 2022-01-06 NOTE — REVIEW OF SYSTEMS
[Nocturia] : nocturia [Poor quality erections] : Poor quality erections [Convulsions] : convulsions [Anxiety] : anxiety [Negative] : Heme/Lymph [Feeling Poorly] : not feeling poorly [Feeling Tired] : not feeling tired [Eyesight Problems] : no eyesight problems [Loss Of Hearing] : no hearing loss [Nosebleeds] : no nosebleeds [Chest Pain] : no chest pain [Cough] : no cough [Wheezing] : no wheezing [Constipation] : no constipation [Diarrhea] : no diarrhea [Heartburn] : no heartburn [Dysuria] : no dysuria [Incontinence] : no incontinence [Testicular Pain] : no testicular pain [Depression] : no depression [Erectile Dysfunction] : no erectile dysfunction

## 2022-01-06 NOTE — HISTORY OF PRESENT ILLNESS
[FreeTextEntry1] : 12/17/2018: Mr. Hudson is a 31 year old male presenting with a potential right hydrocele that appeared about one week ago. Patient had a bilateral hydrocele operation in 2005. Since the surgery, patient didn't have any testicular pain. Wakes up 1-2 times during the night to urinate. Patient denies dysuria, gross hematuria, urgency, or incontinence. Notes that his sexual function is satisfactory. Takes LevETIRAcetam to control his seizures. Currently unemployed.\par \par 06/03/2021: Patient presents today for follow up. Has taken Cialis 20mg PRN for ED which works only sometimes. States that erections will get soft early. Additionally complains of urinary hesitancy. \par \par 06/22/2021: Patient presents today for a follow up. Has taken tadalafil 5 mg once daily however he has also taken 20 mg in addition to the 5 mg on some days that he wants to have sex. He feels libido is increased since taking tadalafil but still decreased overall. He is currently sexually active with one partner who has noticed an improvement. Urination is still delayed but much improved. He states he used to wait about 1 minute before voiding and now he waits about 25 seconds. Admits to having one episode of premature ejaculation in the past. +PMHx of epilepsy. Denies constipation, chest pains, or any new aches and pains. \par \par 01/05/2022: Patient presents today for follow up. Today complains of dry skin on his penis, cracking, and redness. No pain. Occured before any type of sexual activity. Has been using Lubriderm which has helped. Does eat meat and vegetable diet. Skin has been improving. Continues to take tadalafil 5mg once daily which has helped and has not needed to take additional tablets. Urination is good. Still has delayed urination but is better.

## 2022-01-06 NOTE — ADDENDUM
[FreeTextEntry1] : I, Vicki Deanin, acted solely as a scribe for Dr. Clay Vargas on this date 01/05/2022.\par \par All medical record entries made by the Scribe were at my, Dr. Clay Vargas, direction and personally dictated by me on 01/05/2022. I have reviewed the chart and agree that the record accurately reflects my personal performance of the history, physical exam, assessment and plan.  I have also personally directed, reviewed and agreed with the chart.

## 2022-01-08 LAB
APPEARANCE: CLEAR
BACTERIA UR CULT: NORMAL
BACTERIA: NEGATIVE
BILIRUBIN URINE: NEGATIVE
BLOOD URINE: NEGATIVE
CALCIUM OXALATE CRYSTALS: ABNORMAL
COLOR: YELLOW
GLUCOSE QUALITATIVE U: NEGATIVE
HYALINE CASTS: 0 /LPF
KETONES URINE: ABNORMAL
LEUKOCYTE ESTERASE URINE: NEGATIVE
MICROSCOPIC-UA: NORMAL
NITRITE URINE: NEGATIVE
PH URINE: 6
PROTEIN URINE: NORMAL
RED BLOOD CELLS URINE: 1 /HPF
SPECIFIC GRAVITY URINE: 1.04
SQUAMOUS EPITHELIAL CELLS: 0 /HPF
URINE CYTOLOGY: NORMAL
UROBILINOGEN URINE: ABNORMAL
WHITE BLOOD CELLS URINE: 1 /HPF

## 2022-05-25 ENCOUNTER — APPOINTMENT (OUTPATIENT)
Dept: NEUROLOGY | Facility: CLINIC | Age: 32
End: 2022-05-25
Payer: COMMERCIAL

## 2022-05-25 VITALS
WEIGHT: 157 LBS | SYSTOLIC BLOOD PRESSURE: 106 MMHG | HEART RATE: 65 BPM | HEIGHT: 69 IN | DIASTOLIC BLOOD PRESSURE: 74 MMHG | BODY MASS INDEX: 23.25 KG/M2

## 2022-05-25 DIAGNOSIS — R56.9 UNSPECIFIED CONVULSIONS: ICD-10-CM

## 2022-05-25 PROCEDURE — 99203 OFFICE O/P NEW LOW 30 MIN: CPT

## 2022-05-25 NOTE — DISCUSSION/SUMMARY
[FreeTextEntry1] : 32 yo male with GTCs, ?likely focal epilepsy. ( first seizure 2014)\par EEG with ? 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

## 2022-05-25 NOTE — HISTORY OF PRESENT ILLNESS
[FreeTextEntry1] : 30 yo M presents for f/u.  Patient last seen . \par Today reports he is doing well. No complaints. \par Complies with medication. Doing well on Keppra 1500mg po bid. \par Patient driving, working. Seizure precautions discussed. \par Taking MMJ for anxiety/tension has cert, needs renewal.\par \par Last GTC was reported to be in . \par \par Handedness: The patient is right handed. \par Age of Onset: 24-25 yrs of age. \par Seizure / Event Types: Generalized convulsions. Tongue biting with incontinence. \par History of Status Epilepticus: no. \par Epilepsy Risk Factors: no  complications, no febrile seizures, no developmental delay, no head trauma, no meningitis or encephalitis, no stroke and no family history of seizures \par Past treatment has included Only Keppra\par \par Previous hx:\par 31yr M with history of 3 GTC in total- 2 in 2014, followed by 1 in 2014. He underwent an inpatient VEEG which revealed only mild generalized slowing and an MRI Brain w/wo MRA head and neck both in  and  which reveal a subcentimeter area in right frontal lobe that may be a DVA vs Cavernoma. He has been tolerating Keppra 1g BID and has not had an event over 2 years. However in 2016 he reported a GTC episodes preceded by his "right eye spinning around". Keppra increased to 1500 mg BID\par \par MRI Brain showed blush of stable enhancement in right frontal lesion with hemosiderin deposition. \par Routine EEG showed ?left temporal theta activity.

## 2022-07-27 ENCOUNTER — APPOINTMENT (OUTPATIENT)
Dept: UROLOGY | Facility: CLINIC | Age: 32
End: 2022-07-27

## 2022-07-27 ENCOUNTER — NON-APPOINTMENT (OUTPATIENT)
Age: 32
End: 2022-07-27

## 2022-07-27 VITALS
TEMPERATURE: 97.8 F | SYSTOLIC BLOOD PRESSURE: 126 MMHG | HEART RATE: 64 BPM | RESPIRATION RATE: 17 BRPM | DIASTOLIC BLOOD PRESSURE: 78 MMHG | OXYGEN SATURATION: 99 %

## 2022-07-27 DIAGNOSIS — R20.8 OTHER DISTURBANCES OF SKIN SENSATION: ICD-10-CM

## 2022-07-27 PROCEDURE — 99214 OFFICE O/P EST MOD 30 MIN: CPT

## 2022-07-28 LAB
ALBUMIN SERPL ELPH-MCNC: 4.8 G/DL
ALP BLD-CCNC: 60 U/L
ALT SERPL-CCNC: 12 U/L
ANION GAP SERPL CALC-SCNC: 10 MMOL/L
APPEARANCE: CLEAR
AST SERPL-CCNC: 27 U/L
BACTERIA: NEGATIVE
BASOPHILS # BLD AUTO: 0.04 K/UL
BASOPHILS NFR BLD AUTO: 0.6 %
BILIRUB SERPL-MCNC: 0.8 MG/DL
BILIRUBIN URINE: NEGATIVE
BLOOD URINE: NEGATIVE
BUN SERPL-MCNC: 16 MG/DL
CALCIUM SERPL-MCNC: 9.9 MG/DL
CHLORIDE SERPL-SCNC: 104 MMOL/L
CO2 SERPL-SCNC: 27 MMOL/L
COLOR: YELLOW
CREAT SERPL-MCNC: 1.12 MG/DL
DHEA-S SERPL-MCNC: 322 UG/DL
EGFR: 89 ML/MIN/1.73M2
EOSINOPHIL # BLD AUTO: 0.19 K/UL
EOSINOPHIL NFR BLD AUTO: 2.8 %
ESTIMATED AVERAGE GLUCOSE: 114 MG/DL
ESTRADIOL SERPL-MCNC: 15 PG/ML
FSH SERPL-MCNC: 3.3 IU/L
GLUCOSE QUALITATIVE U: NEGATIVE
GLUCOSE SERPL-MCNC: 95 MG/DL
HBA1C MFR BLD HPLC: 5.6 %
HCT VFR BLD CALC: 48.3 %
HGB BLD-MCNC: 15.8 G/DL
HYALINE CASTS: 0 /LPF
IMM GRANULOCYTES NFR BLD AUTO: 0.3 %
KETONES URINE: NEGATIVE
LEUKOCYTE ESTERASE URINE: NEGATIVE
LH SERPL-ACNC: 3.7 IU/L
LYMPHOCYTES # BLD AUTO: 2.31 K/UL
LYMPHOCYTES NFR BLD AUTO: 34.6 %
MAN DIFF?: NORMAL
MCHC RBC-ENTMCNC: 29 PG
MCHC RBC-ENTMCNC: 32.7 GM/DL
MCV RBC AUTO: 88.6 FL
MICROSCOPIC-UA: NORMAL
MONOCYTES # BLD AUTO: 0.62 K/UL
MONOCYTES NFR BLD AUTO: 9.3 %
NEUTROPHILS # BLD AUTO: 3.49 K/UL
NEUTROPHILS NFR BLD AUTO: 52.4 %
NITRITE URINE: NEGATIVE
PH URINE: 6.5
PLATELET # BLD AUTO: 232 K/UL
POTASSIUM SERPL-SCNC: 4.6 MMOL/L
PROGEST SERPL-MCNC: 0.3 NG/ML
PROLACTIN SERPL-MCNC: 11 NG/ML
PROT SERPL-MCNC: 7 G/DL
PROTEIN URINE: NORMAL
RBC # BLD: 5.45 M/UL
RBC # FLD: 12.5 %
RED BLOOD CELLS URINE: 3 /HPF
SODIUM SERPL-SCNC: 141 MMOL/L
SPECIFIC GRAVITY URINE: 1.03
SQUAMOUS EPITHELIAL CELLS: 1 /HPF
UROBILINOGEN URINE: NORMAL
WBC # FLD AUTO: 6.67 K/UL
WHITE BLOOD CELLS URINE: 2 /HPF

## 2022-07-31 PROBLEM — R20.8 DECREASED SENSATION: Status: ACTIVE | Noted: 2022-07-27

## 2022-07-31 LAB
ESTROGEN SERPL-MCNC: 57 PG/ML
SHBG SERPL-SCNC: 66.7 NMOL/L

## 2022-07-31 NOTE — PHYSICAL EXAM
[General Appearance - Well Developed] : well developed [General Appearance - Well Nourished] : well nourished [Normal Appearance] : normal appearance [Well Groomed] : well groomed [General Appearance - In No Acute Distress] : no acute distress [Abdomen Soft] : soft [Abdomen Tenderness] : non-tender [Abdomen Hernia] : no hernia was discovered [Costovertebral Angle Tenderness] : no ~M costovertebral angle tenderness [FreeTextEntry1] : The patient is circumcised. Visible appearance is normal. Urethral meatus is normal. Mucosa within is normal. No exudate. He says that tactile sensation is decreased across 80% of proximal shaft, distal 20% of glans is normal. Scrotal skin is normal. Testes are non tender and without masses. Scrotal and testicle sensation are normal. No palpable varicoceles. Sensation in ilial and inguinal nerve in medial scrotum, inguinal canal, and thigh is normal. \par  [Edema] : no peripheral edema [] : no respiratory distress [Respiration, Rhythm And Depth] : normal respiratory rhythm and effort [Exaggerated Use Of Accessory Muscles For Inspiration] : no accessory muscle use [Oriented To Time, Place, And Person] : oriented to person, place, and time [Affect] : the affect was normal [Mood] : the mood was normal [Not Anxious] : not anxious [Normal Station and Gait] : the gait and station were normal for the patient's age [No Focal Deficits] : no focal deficits [No Palpable Adenopathy] : no palpable adenopathy

## 2022-07-31 NOTE — REVIEW OF SYSTEMS
[Hesitancy] : urinary hesitancy [Nocturia] : nocturia [Poor quality erections] : Poor quality erections [Convulsions] : convulsions [Anxiety] : anxiety [Erectile Dysfunction] : erectile dysfunction [Libido Decreased] : libido decreased [Negative] : Heme/Lymph [Feeling Poorly] : not feeling poorly [Feeling Tired] : not feeling tired [Eyesight Problems] : no eyesight problems [Loss Of Hearing] : no hearing loss [Nosebleeds] : no nosebleeds [Chest Pain] : no chest pain [Cough] : no cough [Wheezing] : no wheezing [Constipation] : no constipation [Diarrhea] : no diarrhea [Heartburn] : no heartburn [Dysuria] : no dysuria [Incontinence] : no incontinence [Testicular Pain] : no testicular pain [Depression] : no depression

## 2022-07-31 NOTE — PHYSICAL EXAM
[General Appearance - Well Developed] : well developed [General Appearance - Well Nourished] : well nourished [Normal Appearance] : normal appearance [Well Groomed] : well groomed [General Appearance - In No Acute Distress] : no acute distress [Abdomen Soft] : soft [Abdomen Tenderness] : non-tender [Abdomen Hernia] : no hernia was discovered [Costovertebral Angle Tenderness] : no ~M costovertebral angle tenderness [Edema] : no peripheral edema [] : no respiratory distress [Respiration, Rhythm And Depth] : normal respiratory rhythm and effort [Exaggerated Use Of Accessory Muscles For Inspiration] : no accessory muscle use [Oriented To Time, Place, And Person] : oriented to person, place, and time [Affect] : the affect was normal [Mood] : the mood was normal [Not Anxious] : not anxious [Normal Station and Gait] : the gait and station were normal for the patient's age [No Focal Deficits] : no focal deficits [No Palpable Adenopathy] : no palpable adenopathy [FreeTextEntry1] : The patient is circumcised. Visible appearance is normal. Urethral meatus is normal. Mucosa within is normal. No exudate. He says that tactile sensation is decreased across 80% of proximal shaft, distal 20% of glans is normal. Scrotal skin is normal. Testes are non tender and without masses. Scrotal and testicle sensation are normal. No palpable varicoceles. Sensation in ilial and inguinal nerve in medial scrotum, inguinal canal, and thigh is normal. \par

## 2022-07-31 NOTE — HISTORY OF PRESENT ILLNESS
[FreeTextEntry1] : 12/17/2018: Mr. Hudson is a 31 year old male presenting with a potential right hydrocele that appeared about one week ago. Patient had a bilateral hydrocele operation in 2005. Since the surgery, patient didn't have any testicular pain. Wakes up 1-2 times during the night to urinate. Patient denies dysuria, gross hematuria, urgency, or incontinence. Notes that his sexual function is satisfactory. Takes LevETIRAcetam to control his seizures. Currently unemployed.\par \par 06/03/2021: Patient presents today for follow up. Has taken Cialis 20mg PRN for ED which works only sometimes. States that erections will get soft early. Additionally complains of urinary hesitancy. \par \par 06/22/2021: Patient presents today for a follow up. Has taken tadalafil 5 mg once daily however he has also taken 20 mg in addition to the 5 mg on some days that he wants to have sex. He feels libido is increased since taking tadalafil but still decreased overall. He is currently sexually active with one partner who has noticed an improvement. Urination is still delayed but much improved. He states he used to wait about 1 minute before voiding and now he waits about 25 seconds. Admits to having one episode of premature ejaculation in the past. +PMHx of epilepsy. Denies constipation, chest pains, or any new aches and pains. \par \par 01/05/2022: Patient presents today for follow up. Today complains of dry skin on his penis, cracking, and redness. No pain. Occured before any type of sexual activity. Has been using Lubriderm which has helped. Does eat meat and vegetable diet. Skin has been improving. Continues to take tadalafil 5mg once daily which has helped and has not needed to take additional tablets. Urination is good. Still has delayed urination but is better. \par \par 07/27/2022:  presents today for a follow up. He reports that he has not been doing well. He was taking tadalafil 5 mg once daily but about 1.5 months ago it stopped working for him. He did run out but started cutting the 20 mg tablets in half. He still found this to be ineffective. His urination is difficult to initiate. He sometimes has to hum to get it to come out. Can be up to 1 minute before urination starts. He also reports erections are hard to maintain. C/o difficulty to get aroused. Lately he has been stressed since his girlfriend of 10 years had major surgery for herniated disc and is in the hospital. He noticed that his erections and libido decreased as his girlfriend's problems worsened. He c/o decreased sensation in the penile shaft. He denies any other body part besides the penis that he experiences lack of sensation. Denies pins and needles of the penis. Pt is epileptic, last seizure was in 2015. Had grand mal seizures, occurred mostly when he was sleeping. Is maintained on Keppra 3000 a day. Follows with a neurologist. Denies FHx of prostate cancer to his knowledge. Was not legally adopted but was raised by his aunt and uncle. Knows and speaks to his mother. Only met father once. Pt works for [employer]. Pt smokes marijuana fairly heavily. \par  Reflex sympathetic dystrophy Atrial fibrillation

## 2022-07-31 NOTE — HISTORY OF PRESENT ILLNESS
[FreeTextEntry1] : 12/17/2018: Mr. Hudson is a 31 year old male presenting with a potential right hydrocele that appeared about one week ago. Patient had a bilateral hydrocele operation in 2005. Since the surgery, patient didn't have any testicular pain. Wakes up 1-2 times during the night to urinate. Patient denies dysuria, gross hematuria, urgency, or incontinence. Notes that his sexual function is satisfactory. Takes LevETIRAcetam to control his seizures. Currently unemployed.\par \par 06/03/2021: Patient presents today for follow up. Has taken Cialis 20mg PRN for ED which works only sometimes. States that erections will get soft early. Additionally complains of urinary hesitancy. \par \par 06/22/2021: Patient presents today for a follow up. Has taken tadalafil 5 mg once daily however he has also taken 20 mg in addition to the 5 mg on some days that he wants to have sex. He feels libido is increased since taking tadalafil but still decreased overall. He is currently sexually active with one partner who has noticed an improvement. Urination is still delayed but much improved. He states he used to wait about 1 minute before voiding and now he waits about 25 seconds. Admits to having one episode of premature ejaculation in the past. +PMHx of epilepsy. Denies constipation, chest pains, or any new aches and pains. \par \par 01/05/2022: Patient presents today for follow up. Today complains of dry skin on his penis, cracking, and redness. No pain. Occured before any type of sexual activity. Has been using Lubriderm which has helped. Does eat meat and vegetable diet. Skin has been improving. Continues to take tadalafil 5mg once daily which has helped and has not needed to take additional tablets. Urination is good. Still has delayed urination but is better. \par \par 07/27/2022:  presents today for a follow up. He reports that he has not been doing well. He was taking tadalafil 5 mg once daily but about 1.5 months ago it stopped working for him. He did run out but started cutting the 20 mg tablets in half. He still found this to be ineffective. His urination is difficult to initiate. He sometimes has to hum to get it to come out. Can be up to 1 minute before urination starts. He also reports erections are hard to maintain. C/o difficulty to get aroused. Lately he has been stressed since his girlfriend of 10 years had major surgery for herniated disc and is in the hospital. He noticed that his erections and libido decreased as his girlfriend's problems worsened. He c/o decreased sensation in the penile shaft. He denies any other body part besides the penis that he experiences lack of sensation. Denies pins and needles of the penis. Pt is epileptic, last seizure was in 2015. Had grand mal seizures, occurred mostly when he was sleeping. Is maintained on Keppra 3000 a day. Follows with a neurologist. Denies FHx of prostate cancer to his knowledge. Was not legally adopted but was raised by his aunt and uncle. Knows and speaks to his mother. Only met father once. Pt works for [employer]. Pt smokes marijuana fairly heavily. \par

## 2022-07-31 NOTE — ASSESSMENT
[FreeTextEntry1] : 12/17/2018: Mr. Hudson is a 32 year old male presenting with a potential right hydrocele that appeared about one week ago. Patient had a bilateral hydrocele operation in 2005. Since the surgery, patient didn't have any testicular pain. Wakes up 1-2 times during the night to urinate. Patient denies dysuria, gross hematuria, urgency, or incontinence. Notes that his sexual function is satisfactory. Takes LevETIRAcetam to control his seizures. Currently unemployed.\par \par 06/03/2021: Patient presents today for follow up. Has taken Cialis 20mg PRN for ED which works only sometimes. States that erections will get soft early. Additionally complains of urinary hesitancy. \par \par 06/22/2021: Patient presents today for a follow up. Has taken tadalafil 5 mg once daily however he has also taken 20 mg in addition to the 5 mg on some days that he wants to have sex. He feels libido is increased since taking tadalafil but still decreased overall. He is currently sexually active with one partner who has noticed an improvement. Urination is still delayed but much improved. He states he used to wait about 1 minute before voiding and now he waits about 25 seconds. Admits to having one episode of premature ejaculation in the past. +PMHx of epilepsy. Denies constipation, chest pains, or any new aches and pains. \par \par 01/05/2022: Patient presents today for follow up. Today complains of dry skin on his penis, cracking, and redness. No pain. Occured before any type of sexual activity. Has been using Lubriderm which has helped. Does eat meat and vegetable diet. Skin has been improving. Continues to take tadalafil 5mg once daily which has helped and has not needed to take additional tablets. Urination is good. Still has delayed urination but is better. \par \par I recommend patient moisturize at least 3x a day for dry skin. \par Continue tadalafil 5mg once daily for ED and urination. \par The patient produced a urine sample which will be sent for urinalysis, urine cytology, and urine culture. \par RTO in 1 year for follow up or sooner if new urinary symptoms develop. \par \par 07/27/2022:  presents today for a follow up. He reports that he has not been doing well. He was taking tadalafil 5 mg once daily but about 1.5 months ago it stopped working for him. He did run out but started cutting the 20 mg tablets in half. He still found this to be ineffective. His urination is difficult to initiate. He sometimes has to hum to get it to come out. Can be up to 1 minute before urination starts. He also reports erections are hard to maintain. C/o difficulty to get aroused. Lately he has been stressed since his girlfriend of 10 years had major surgery for herniated disc and is in the hospital. He noticed that his erections and libido decreased as his girlfriend's problems worsened. He c/o decreased sensation in the penile shaft. He denies any other body part besides the penis that he experiences lack of sensation. Denies pins and needles of the penis. Pt is epileptic, last seizure was in 2015. Had grand mal seizures, occurred mostly when he was sleeping. Is maintained on Keppra 3000 a day. Follows with a neurologist. Denies FHx of prostate cancer to his knowledge. Was not legally adopted but was raised by his aunt and uncle. Knows and speaks to his mother. Only met father once. Pt works for [employer]. Pt smokes marijuana fairly heavily. \par \par PE: The patient is circumcised. Visible appearance is normal. Urethral meatus is normal. Mucosa within is normal. No exudate. He says that tactile sensation is decreased across 80% of proximal shaft, distal 20% of glans is normal. Scrotal skin is normal. Testes are non tender and without masses. Scrotal and testicle sensation are normal. No palpable varicoceles. Sensation in ilial and inguinal nerve in medial scrotum, inguinal canal, and thigh is normal. \par \par The patient produced a urine sample which will be sent for urinalysis, urine cytology, and urine culture. \par \par Blood work today includes androstenedione, A1C, CBC with diff, CMP, dehydroepiandrosterone serum, dehydroepiandrosterone -sulfate serum, dihydrotestosterone, estradiol, estrogen total, FSH, LH, progesterone, prolactin, SHBG, levetiracetam level, and TSH. \par  \par I explained that smoking marijuana effects fertility as well as libido. He states he has not been smoking that much lately due to his girlfriend being in the hospital. I strongly advised him to cut back on the amount he smokes. \par \par Patient will schedule a telehealth visit in 2 weeks to discuss hormone levels. \par \par Preparation, in person office visit, and coordination of care: 30 minutes.

## 2022-07-31 NOTE — REVIEW OF SYSTEMS
[Feeling Poorly] : not feeling poorly [Feeling Tired] : not feeling tired [Eyesight Problems] : no eyesight problems [Loss Of Hearing] : no hearing loss [Nosebleeds] : no nosebleeds [Chest Pain] : no chest pain [Cough] : no cough [Wheezing] : no wheezing [Constipation] : no constipation [Diarrhea] : no diarrhea [Heartburn] : no heartburn [Dysuria] : no dysuria [Incontinence] : no incontinence [Hesitancy] : urinary hesitancy [Nocturia] : nocturia [Testicular Pain] : no testicular pain [Poor quality erections] : Poor quality erections [Convulsions] : convulsions [Anxiety] : anxiety [Depression] : no depression [Erectile Dysfunction] : erectile dysfunction [Libido Decreased] : libido decreased [Negative] : Heme/Lymph

## 2022-07-31 NOTE — ADDENDUM
[FreeTextEntry1] : This note was authored by Talia Ospina working as a scribe for Dr.Gary Vargas. I, Dr. Clay Vargas have reviewed the content of this note and confirm it is true and accurate. I personally performed the history and physical examination and made all the decisions 07/27/2022.

## 2022-08-01 LAB
DHEA SERPL-MCNC: 259 NG/DL
LEVETIRACETAM SERPL-MCNC: 25 UG/ML

## 2022-08-02 LAB — ANDROST SERPL-MCNC: 93 NG/DL

## 2022-08-05 LAB — ANDROSTANOLONE SERPL-MCNC: 51 NG/DL

## 2022-08-18 ENCOUNTER — RX CHANGE (OUTPATIENT)
Age: 32
End: 2022-08-18

## 2022-08-23 ENCOUNTER — APPOINTMENT (OUTPATIENT)
Dept: UROLOGY | Facility: CLINIC | Age: 32
End: 2022-08-23

## 2022-08-23 PROCEDURE — 99214 OFFICE O/P EST MOD 30 MIN: CPT | Mod: 95

## 2022-08-23 NOTE — ASSESSMENT
[FreeTextEntry1] : 12/17/2018: Mr. Hudson is a 32 year old male presenting with a potential right hydrocele that appeared about one week ago. Patient had a bilateral hydrocele operation in 2005. Since the surgery, patient didn't have any testicular pain. Wakes up 1-2 times during the night to urinate. Patient denies dysuria, gross hematuria, urgency, or incontinence. Notes that his sexual function is satisfactory. Takes LevETIRAcetam to control his seizures. Currently unemployed.\par \par 06/03/2021: Patient presents today for follow up. Has taken Cialis 20mg PRN for ED which works only sometimes. States that erections will get soft early. Additionally complains of urinary hesitancy. \par \par 06/22/2021: Patient presents today for a follow up. Has taken tadalafil 5 mg once daily however he has also taken 20 mg in addition to the 5 mg on some days that he wants to have sex. He feels libido is increased since taking tadalafil but still decreased overall. He is currently sexually active with one partner who has noticed an improvement. Urination is still delayed but much improved. He states he used to wait about 1 minute before voiding and now he waits about 25 seconds. Admits to having one episode of premature ejaculation in the past. +PMHx of epilepsy. Denies constipation, chest pains, or any new aches and pains. \par \par 01/05/2022: Patient presents today for follow up. Today complains of dry skin on his penis, cracking, and redness. No pain. Occured before any type of sexual activity. Has been using Lubriderm which has helped. Does eat meat and vegetable diet. Skin has been improving. Continues to take tadalafil 5mg once daily which has helped and has not needed to take additional tablets. Urination is good. Still has delayed urination but is better. \par \par I recommend patient moisturize at least 3x a day for dry skin. \par Continue tadalafil 5mg once daily for ED and urination. \par The patient produced a urine sample which will be sent for urinalysis, urine cytology, and urine culture. \par RTO in 1 year for follow up or sooner if new urinary symptoms develop. \par \par 07/27/2022:  presents today for a follow up. He reports that he has not been doing well. He was taking tadalafil 5 mg once daily but about 1.5 months ago it stopped working for him. He did run out but started cutting the 20 mg tablets in half. He still found this to be ineffective. His urination is difficult to initiate. He sometimes has to hum to get it to come out. Can be up to 1 minute before urination starts. He also reports erections are hard to maintain. C/o difficulty to get aroused. Lately he has been stressed since his girlfriend of 10 years had major surgery for herniated disc and is in the hospital. He noticed that his erections and libido decreased as his girlfriend's problems worsened. He c/o decreased sensation in the penile shaft. He denies any other body part besides the penis that he experiences lack of sensation. Denies pins and needles of the penis. Pt is epileptic, last seizure was in 2015. Had grand mal seizures, occurred mostly when he was sleeping. Is maintained on Keppra 3000 a day. Follows with a neurologist. Denies FHx of prostate cancer to his knowledge. Was not legally adopted but was raised by his aunt and uncle. Knows and speaks to his mother. Only met father once. Pt works for [employer]. Pt smokes marijuana fairly heavily. \par \par PE: The patient is circumcised. Visible appearance is normal. Urethral meatus is normal. Mucosa within is normal. No exudate. He says that tactile sensation is decreased across 80% of proximal shaft, distal 20% of glans is normal. Scrotal skin is normal. Testes are non tender and without masses. Scrotal and testicle sensation are normal. No palpable varicoceles. Sensation in ilial and inguinal nerve in medial scrotum, inguinal canal, and thigh is normal. \par \par The patient produced a urine sample which will be sent for urinalysis, urine cytology, and urine culture. \par Blood work today includes androstenedione, A1C, CBC with diff, CMP, dehydroepiandrosterone serum, dehydroepiandrosterone -sulfate serum, dihydrotestosterone, estradiol, estrogen total, FSH, LH, progesterone, prolactin, SHBG, levetiracetam level, and TSH. \par I explained that smoking marijuana effects fertility as well as libido. He states he has not been smoking that much lately due to his girlfriend being in the hospital. I strongly advised him to cut back on the amount he smokes. \par Patient will schedule a telehealth visit in 2 weeks to discuss hormone levels. \par \par 08/23/2022:  presents today for a telehealth visit. He reports he is actually feeling better. He has been taking tadalafil 5 mg once daily which he has found to improve his urination. The delay in urination when he tries to initiate is shorter. If he feels the urge to go he is able to urinate as soon as he initiates it. He is still not really sexually active due to his wife's back problems, however she is improving and he anticipates being sexually active soon. He no longer has a supply of tadalafil 20 mg PRN.\par \par Reviewed and discussed laboratory work of 7/27/2022 in detail. Progesterone was elevated at 0.3 ng/ml. Free testosterone was elevated at 7.6 pg/mL. Total testosterone was normal at 636. SHBG was elevated at 66.7 nmol/L. All other hormone levels were within normal range. UA was normal other than elevated specific gravity, patient advised to drink more water.\par \par Renewed tadalafil 5 mg once daily for urination as well as tadalafil 20 mg PRN for sex. I informed him that since he has tolerated 20 mg in the past, if he finds it is not adequate enough for erections, he can try taking 30 mg but to not exceed 30 mg without speaking to me. \par \par Patient will have a telehealth visit in 2 months, sooner if clinically indicated. \par \par Preparation, audio-visual visit, and coordination of care took : 30 minutes.

## 2022-08-23 NOTE — HISTORY OF PRESENT ILLNESS
[FreeTextEntry1] : 12/17/2018: Mr. Hudson is a 32 year old male presenting with a potential right hydrocele that appeared about one week ago. Patient had a bilateral hydrocele operation in 2005. Since the surgery, patient didn't have any testicular pain. Wakes up 1-2 times during the night to urinate. Patient denies dysuria, gross hematuria, urgency, or incontinence. Notes that his sexual function is satisfactory. Takes LevETIRAcetam to control his seizures. Currently unemployed.\par \par 06/03/2021: Patient presents today for follow up. Has taken Cialis 20mg PRN for ED which works only sometimes. States that erections will get soft early. Additionally complains of urinary hesitancy. \par \par 06/22/2021: Patient presents today for a follow up. Has taken tadalafil 5 mg once daily however he has also taken 20 mg in addition to the 5 mg on some days that he wants to have sex. He feels libido is increased since taking tadalafil but still decreased overall. He is currently sexually active with one partner who has noticed an improvement. Urination is still delayed but much improved. He states he used to wait about 1 minute before voiding and now he waits about 25 seconds. Admits to having one episode of premature ejaculation in the past. +PMHx of epilepsy. Denies constipation, chest pains, or any new aches and pains. \par \par 01/05/2022: Patient presents today for follow up. Today complains of dry skin on his penis, cracking, and redness. No pain. Occured before any type of sexual activity. Has been using Lubriderm which has helped. Does eat meat and vegetable diet. Skin has been improving. Continues to take tadalafil 5mg once daily which has helped and has not needed to take additional tablets. Urination is good. Still has delayed urination but is better. \par \par 07/27/2022:  presents today for a follow up. He reports that he has not been doing well. He was taking tadalafil 5 mg once daily but about 1.5 months ago it stopped working for him. He did run out but started cutting the 20 mg tablets in half. He still found this to be ineffective. His urination is difficult to initiate. He sometimes has to hum to get it to come out. Can be up to 1 minute before urination starts. He also reports erections are hard to maintain. C/o difficulty to get aroused. Lately he has been stressed since his girlfriend of 10 years had major surgery for herniated disc and is in the hospital. He noticed that his erections and libido decreased as his girlfriend's problems worsened. He c/o decreased sensation in the penile shaft. He denies any other body part besides the penis that he experiences lack of sensation. Denies pins and needles of the penis. Pt is epileptic, last seizure was in 2015. Had grand mal seizures, occurred mostly when he was sleeping. Is maintained on Keppra 3000 a day. Follows with a neurologist. Denies FHx of prostate cancer to his knowledge. Was not legally adopted but was raised by his aunt and uncle. Knows and speaks to his mother. Only met father once. Pt works for [employer]. Pt smokes marijuana fairly heavily. \par \par 08/23/2022:  presents today for a telehealth visit. He reports he is actually feeling better. He has been taking tadalafil 5 mg once daily which he has found to improve his urination. The delay in urination when he tries to initiate is shorter. If he feels the urge to go he is able to urinate as soon as he initiates it. He is still not really sexually active due to his wife's back problems, however she is improving and he anticipates being sexually active soon. He no longer has a supply of tadalafil 20 mg PRN.

## 2022-08-23 NOTE — ADDENDUM
[FreeTextEntry1] : This note was authored by Talia Ospina working as a scribe for Dr.Gary Vargas. I, Dr. Clay Vargas have reviewed the content of this note and confirm it is true and accurate. I personally performed the history and physical examination and made all the decisions 08/23/2022

## 2022-08-23 NOTE — PHYSICAL EXAM
[General Appearance - Well Developed] : well developed [General Appearance - Well Nourished] : well nourished [Normal Appearance] : normal appearance [Well Groomed] : well groomed [General Appearance - In No Acute Distress] : no acute distress [Skin Color & Pigmentation] : normal skin color and pigmentation [] : no respiratory distress [Respiration, Rhythm And Depth] : normal respiratory rhythm and effort [Oriented To Time, Place, And Person] : oriented to person, place, and time [Affect] : the affect was normal [Mood] : the mood was normal [Not Anxious] : not anxious

## 2023-02-28 ENCOUNTER — RX RENEWAL (OUTPATIENT)
Age: 33
End: 2023-02-28

## 2023-05-17 ENCOUNTER — APPOINTMENT (OUTPATIENT)
Dept: UROLOGY | Facility: CLINIC | Age: 33
End: 2023-05-17
Payer: COMMERCIAL

## 2023-05-17 LAB
TESTOST FREE SERPL-MCNC: 7.6 PG/ML
TESTOST SERPL-MCNC: 636 NG/DL

## 2023-05-17 PROCEDURE — 99215 OFFICE O/P EST HI 40 MIN: CPT | Mod: 95

## 2023-05-18 NOTE — ASSESSMENT
[FreeTextEntry1] : 12/17/2019: Mr. Hudson is a 32 year old male presenting with a potential right hydrocele that appeared about one week ago. Patient had a bilateral hydrocele operation in 2005. Since the surgery, patient didn't have any testicular pain. Wakes up 1-2 times during the night to urinate. Patient denies dysuria, gross hematuria, urgency, or incontinence. Notes that his sexual function is satisfactory. Takes LevETIRAcetam to control his seizures. Currently unemployed.\par \par 06/03/2021: Patient presents today for follow up. Has taken Cialis 20mg PRN for ED which works only sometimes. States that erections will get soft early. Additionally complains of urinary hesitancy. \par \par 06/22/2021: Patient presents today for a follow up. Has taken tadalafil 5 mg once daily however he has also taken 20 mg in addition to the 5 mg on some days that he wants to have sex. He feels libido is increased since taking tadalafil but still decreased overall. He is currently sexually active with one partner who has noticed an improvement. Urination is still delayed but much improved. He states he used to wait about 1 minute before voiding and now he waits about 25 seconds. Admits to having one episode of premature ejaculation in the past. +PMHx of epilepsy. Denies constipation, chest pains, or any new aches and pains. \par \par 01/05/2022: Patient presents today for follow up. Today complains of dry skin on his penis, cracking, and redness. No pain. Occured before any type of sexual activity. Has been using Lubriderm which has helped. Does eat meat and vegetable diet. Skin has been improving. Continues to take tadalafil 5mg once daily which has helped and has not needed to take additional tablets. Urination is good. Still has delayed urination but is better. \par \par I recommend patient moisturize at least 3x a day for dry skin. \par Continue tadalafil 5mg once daily for ED and urination. \par The patient produced a urine sample which will be sent for urinalysis, urine cytology, and urine culture. \par RTO in 1 year for follow up or sooner if new urinary symptoms develop. \par \par 07/27/2022:  presents today for a follow up. He reports that he has not been doing well. He was taking tadalafil 5 mg once daily but about 1.5 months ago it stopped working for him. He did run out but started cutting the 20 mg tablets in half. He still found this to be ineffective. His urination is difficult to initiate. He sometimes has to hum to get it to come out. Can be up to 1 minute before urination starts. He also reports erections are hard to maintain. C/o difficulty to get aroused. Lately he has been stressed since his girlfriend of 10 years had major surgery for herniated disc and is in the hospital. He noticed that his erections and libido decreased as his girlfriend's problems worsened. He c/o decreased sensation in the penile shaft. He denies any other body part besides the penis that he experiences lack of sensation. Denies pins and needles of the penis. Pt is epileptic, last seizure was in 2015. Had grand mal seizures, occurred mostly when he was sleeping. Is maintained on Keppra 3000 a day. Follows with a neurologist. Denies FHx of prostate cancer to his knowledge. Was not legally adopted but was raised by his aunt and uncle. Knows and speaks to his mother. Only met father once. Pt works for [employer]. Pt smokes marijuana fairly heavily. \par \par PE: The patient is circumcised. Visible appearance is normal. Urethral meatus is normal. Mucosa within is normal. No exudate. He says that tactile sensation is decreased across 80% of proximal shaft, distal 20% of glans is normal. Scrotal skin is normal. Testes are non tender and without masses. Scrotal and testicle sensation are normal. No palpable varicoceles. Sensation in ilial and inguinal nerve in medial scrotum, inguinal canal, and thigh is normal. \par \par The patient produced a urine sample which will be sent for urinalysis, urine cytology, and urine culture. \par Blood work today includes androstenedione, A1C, CBC with diff, CMP, dehydroepiandrosterone serum, dehydroepiandrosterone -sulfate serum, dihydrotestosterone, estradiol, estrogen total, FSH, LH, progesterone, prolactin, SHBG, levetiracetam level, and TSH. \par I explained that smoking marijuana effects fertility as well as libido. He states he has not been smoking that much lately due to his girlfriend being in the hospital. I strongly advised him to cut back on the amount he smokes. \par Patient will schedule a telehealth visit in 2 weeks to discuss hormone levels. \par \par 08/23/2022:  presents today for a telehealth visit. He reports he is actually feeling better. He has been taking tadalafil 5 mg once daily which he has found to improve his urination. The delay in urination when he tries to initiate is shorter. If he feels the urge to go he is able to urinate as soon as he initiates it. He is still not really sexually active due to his wife's back problems, however she is improving and he anticipates being sexually active soon. He no longer has a supply of tadalafil 20 mg PRN.\par \[facility] Reviewed and discussed laboratory work of 7/27/2022 in detail. Progesterone was elevated at 0.3 ng/ml. Free testosterone was elevated at 7.6 pg/mL. Total testosterone was normal at 636. SHBG was elevated at 66.7 nmol/L. All other hormone levels were within normal range. UA was normal other than elevated specific gravity, patient advised to drink more water.\par Renewed tadalafil 5 mg once daily for urination as well as tadalafil 20 mg PRN for sex. I informed him that since he has tolerated 20 mg in the past, if he finds it is not adequate enough for erections, he can try taking 30 mg but to not exceed 30 mg without speaking to me. \par Patient will have a telehealth visit in 2 months, sooner if clinically indicated. \par \par 05/17/2023: Mr. TRACY HUDSON presents today for an audio visual telehealth visit for which he gave permission for. The patient was located at home at [address] [place] [address] and I was located at my office in [place], [place]. Today the pt reports redness and a lot of dry skin on the penis. It is cleared up now after using a lot of cortisone cream. Whenever he got an erection it would begin to itch and crack. He does not have eczema anywhere else on his body. He did take a picture to show me the redness when it was occurring and emailed it to me. He reports warm water irritated it so he would clean it with cold water. He reports not being with any new partners or using any new soaps. His partner has not used any new lubricants. He does not use condoms. He reports it comes about pretty randomly and this latest episode lasted about 2 months and was very very itchy. He compared the itching to chicken pox. He denies any rough sexual activity or manual stimulation. He has not been sexually active in a while due to this. His urination is good. Denies any burning. Nocturia x2, unchanged. At this current time the skin looks normal and the erections are normal. He continues to take tadalafil 5 mg once daily and 20 mg PRN for improvement in erections. Sexual desire is good most of the time. \par \par Upon reviewing the pictures, it appears this was eczema. I informed him that the next time this occurs, I want him to reach out right away and I can prescribe a stronger steroid cream but to use cortisone cream until he can get to me. Encouraged the use of gold bond eczema cream every day. In order to to be proactive against it coming back he should use the steroid cream for a month after it resolves. When he is having a flare up, he should use Benadryl for itching. \par \par Patient will go to his nearest  lab to provide blood and urine. Reviewed last blood work done in July 2022. \par \par Patient will have a telehealth visit in 3 months, sooner if clinically indicated. \par \par Preparation, audio-visual visit, and coordination of care took : 45 minutes

## 2023-05-18 NOTE — HISTORY OF PRESENT ILLNESS
[FreeTextEntry1] : 12/17/2018: Mr. Hudson is a 32 year old male presenting with a potential right hydrocele that appeared about one week ago. Patient had a bilateral hydrocele operation in 2005. Since the surgery, patient didn't have any testicular pain. Wakes up 1-2 times during the night to urinate. Patient denies dysuria, gross hematuria, urgency, or incontinence. Notes that his sexual function is satisfactory. Takes LevETIRAcetam to control his seizures. Currently unemployed.\par \par 06/03/2021: Patient presents today for follow up. Has taken Cialis 20mg PRN for ED which works only sometimes. States that erections will get soft early. Additionally complains of urinary hesitancy. \par \par 06/22/2021: Patient presents today for a follow up. Has taken tadalafil 5 mg once daily however he has also taken 20 mg in addition to the 5 mg on some days that he wants to have sex. He feels libido is increased since taking tadalafil but still decreased overall. He is currently sexually active with one partner who has noticed an improvement. Urination is still delayed but much improved. He states he used to wait about 1 minute before voiding and now he waits about 25 seconds. Admits to having one episode of premature ejaculation in the past. +PMHx of epilepsy. Denies constipation, chest pains, or any new aches and pains. \par \par 01/05/2022: Patient presents today for follow up. Today complains of dry skin on his penis, cracking, and redness. No pain. Occured before any type of sexual activity. Has been using Lubriderm which has helped. Does eat meat and vegetable diet. Skin has been improving. Continues to take tadalafil 5mg once daily which has helped and has not needed to take additional tablets. Urination is good. Still has delayed urination but is better. \par \par 07/27/2022:  presents today for a follow up. He reports that he has not been doing well. He was taking tadalafil 5 mg once daily but about 1.5 months ago it stopped working for him. He did run out but started cutting the 20 mg tablets in half. He still found this to be ineffective. His urination is difficult to initiate. He sometimes has to hum to get it to come out. Can be up to 1 minute before urination starts. He also reports erections are hard to maintain. C/o difficulty to get aroused. Lately he has been stressed since his girlfriend of 10 years had major surgery for herniated disc and is in the hospital. He noticed that his erections and libido decreased as his girlfriend's problems worsened. He c/o decreased sensation in the penile shaft. He denies any other body part besides the penis that he experiences lack of sensation. Denies pins and needles of the penis. Pt is epileptic, last seizure was in 2015. Had grand mal seizures, occurred mostly when he was sleeping. Is maintained on Keppra 3000 a day. Follows with a neurologist. Denies FHx of prostate cancer to his knowledge. Was not legally adopted but was raised by his aunt and uncle. Knows and speaks to his mother. Only met father once. Pt works for [employer]. Pt smokes marijuana fairly heavily. \par \par 08/23/2022:  presents today for a telehealth visit. He reports he is actually feeling better. He has been taking tadalafil 5 mg once daily which he has found to improve his urination. The delay in urination when he tries to initiate is shorter. If he feels the urge to go he is able to urinate as soon as he initiates it. He is still not really sexually active due to his wife's back problems, however she is improving and he anticipates being sexually active soon. He no longer has a supply of tadalafil 20 mg PRN.\par \par 05/17/2023: Mr. TRACY HUDSON presents today for an audio visual telehealth visit for which he gave permission for. The patient was located at home at [facility] [address] [place] [address] and I was located at my office in [place], [place]. Today the pt reports redness and a lot of dry skin on the penis. It is cleared up now after using a lot of cortisone cream. Whenever he got an erection it would begin to itch and crack. He does not have eczema anywhere else on his body. He did take a picture to show me the redness when it was occurring and emailed it to me. He reports warm water irritated it so he would clean it with cold water. He reports not being with any new partners or using any new soaps. His partner has not used any new lubricants. He does not use condoms. He reports it comes about pretty randomly and this latest episode lasted about 2 months and was very very itchy. He compared the itching to chicken pox. He denies any rough sexual activity or manual stimulation. He has not been sexually active in a while due to this. His urination is good. Denies any burning. Nocturia x2, unchanged. At this current time the skin looks normal and the erections are normal. He continues to take tadalafil 5 mg once daily and 20 mg PRN for improvement in erections. Sexual desire is good most of the time.

## 2023-05-18 NOTE — REVIEW OF SYSTEMS
[Hesitancy] : urinary hesitancy [Nocturia] : nocturia [Poor quality erections] : Poor quality erections [Convulsions] : convulsions [Anxiety] : anxiety [Erectile Dysfunction] : erectile dysfunction [Libido Decreased] : libido decreased [Negative] : Heme/Lymph [Penile Reddening] : penile reddening [Feeling Poorly] : not feeling poorly [Feeling Tired] : not feeling tired [Eyesight Problems] : no eyesight problems [Loss Of Hearing] : no hearing loss [Nosebleeds] : no nosebleeds [Chest Pain] : no chest pain [Cough] : no cough [Wheezing] : no wheezing [Constipation] : no constipation [Diarrhea] : no diarrhea [Heartburn] : no heartburn [Dysuria] : no dysuria [Incontinence] : no incontinence [Testicular Pain] : no testicular pain [Depression] : no depression

## 2023-05-18 NOTE — ADDENDUM
[FreeTextEntry1] : This note was authored by Talia Ospina working as a scribe for Dr.Gary Vargas. I, Dr. Clay Vargas have reviewed the content of this note and confirm it is true and accurate. I personally performed the history and physical examination and made all the decisions 05/17/2023

## 2023-06-13 ENCOUNTER — APPOINTMENT (OUTPATIENT)
Dept: NEUROLOGY | Facility: CLINIC | Age: 33
End: 2023-06-13
Payer: COMMERCIAL

## 2023-06-13 VITALS
DIASTOLIC BLOOD PRESSURE: 89 MMHG | HEIGHT: 69 IN | BODY MASS INDEX: 33.33 KG/M2 | HEART RATE: 76 BPM | WEIGHT: 225 LBS | SYSTOLIC BLOOD PRESSURE: 138 MMHG

## 2023-06-13 DIAGNOSIS — Q28.3 NONTRAUMATIC INTRACRANIAL HEMORRHAGE, UNSPECIFIED: ICD-10-CM

## 2023-06-13 DIAGNOSIS — I62.9 NONTRAUMATIC INTRACRANIAL HEMORRHAGE, UNSPECIFIED: ICD-10-CM

## 2023-06-13 PROCEDURE — 99213 OFFICE O/P EST LOW 20 MIN: CPT

## 2023-06-13 NOTE — DISCUSSION/SUMMARY
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 ( first seizure 2014)\par EEG with ? 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

## 2023-06-13 NOTE — HISTORY OF PRESENT ILLNESS
[FreeTextEntry1] : Updates:  \par 32 yo M presents for f/u.  Patient last seen . \par Today reports he is doing well. No complaints. \par Complies with medication. Doing well on Keppra 1500mg po bid. \par Patient driving, working. Seizure precautions discussed. \par Taking MMJ for anxiety/tension/sz risk, already [place] cert, needs renewal.\par \par Last GTC was reported to be in . \par \par Handedness: The patient is right handed. \par Age of Onset: 24-25 yrs of age. \par Seizure / Event Types: Generalized convulsions. Tongue biting with incontinence. \par History of Status Epilepticus: no. \par Epilepsy Risk Factors: no  complications, no febrile seizures, no developmental delay, no head trauma, no meningitis or encephalitis, no stroke and no family history of seizures \par Past treatment has included Only Keppra\par \par Previous hx:\par 33yr M with history of 3 GTC in total- 2 in 2014, followed by 1 in 2014. He underwent an inpatient VEEG which revealed only mild generalized slowing and an MRI Brain w/wo MRA head and neck both in  and  which reveal a subcentimeter area in right frontal lobe that may be a DVA vs Cavernoma. He has been tolerating Keppra 1g BID and has not had an event over 2 years. However in 2016 he reported a GTC episodes preceded by his "right eye spinning around". Keppra increased to 1500 mg BID\par \par MRI Brain showed blush of stable enhancement in right frontal lesion with hemosiderin deposition. \par Routine EEG showed ?left temporal theta activity.

## 2023-07-26 ENCOUNTER — APPOINTMENT (OUTPATIENT)
Dept: MRI IMAGING | Facility: CLINIC | Age: 33
End: 2023-07-26
Payer: COMMERCIAL

## 2023-07-26 ENCOUNTER — OUTPATIENT (OUTPATIENT)
Dept: OUTPATIENT SERVICES | Facility: HOSPITAL | Age: 33
LOS: 1 days | End: 2023-07-26
Payer: COMMERCIAL

## 2023-07-26 DIAGNOSIS — I62.9 NONTRAUMATIC INTRACRANIAL HEMORRHAGE, UNSPECIFIED: ICD-10-CM

## 2023-07-26 PROCEDURE — 70552 MRI BRAIN STEM W/DYE: CPT | Mod: 26

## 2023-07-26 PROCEDURE — A9585: CPT

## 2023-07-26 PROCEDURE — 70552 MRI BRAIN STEM W/DYE: CPT

## 2023-09-26 RX ORDER — TADALAFIL 5 MG/1
5 TABLET ORAL
Qty: 30 | Refills: 11 | Status: ACTIVE | COMMUNITY
Start: 2021-06-03 | End: 1900-01-01

## 2024-02-05 RX ORDER — TADALAFIL 20 MG/1
20 TABLET ORAL
Qty: 30 | Refills: 11 | Status: ACTIVE | COMMUNITY
Start: 2019-09-16 | End: 1900-01-01

## 2024-02-07 ENCOUNTER — APPOINTMENT (OUTPATIENT)
Dept: UROLOGY | Facility: CLINIC | Age: 34
End: 2024-02-07
Payer: COMMERCIAL

## 2024-02-07 VITALS
DIASTOLIC BLOOD PRESSURE: 81 MMHG | WEIGHT: 160 LBS | OXYGEN SATURATION: 98 % | TEMPERATURE: 98 F | RESPIRATION RATE: 17 BRPM | SYSTOLIC BLOOD PRESSURE: 121 MMHG | HEART RATE: 71 BPM | BODY MASS INDEX: 23.7 KG/M2 | HEIGHT: 69 IN

## 2024-02-07 DIAGNOSIS — N52.9 MALE ERECTILE DYSFUNCTION, UNSPECIFIED: ICD-10-CM

## 2024-02-07 DIAGNOSIS — R35.0 FREQUENCY OF MICTURITION: ICD-10-CM

## 2024-02-07 DIAGNOSIS — N48.9 DISORDER OF PENIS, UNSPECIFIED: ICD-10-CM

## 2024-02-07 DIAGNOSIS — N50.89 OTHER SPECIFIED DISORDERS OF THE MALE GENITAL ORGANS: ICD-10-CM

## 2024-02-07 DIAGNOSIS — R39.11 HESITANCY OF MICTURITION: ICD-10-CM

## 2024-02-07 DIAGNOSIS — R68.82 DECREASED LIBIDO: ICD-10-CM

## 2024-02-07 PROCEDURE — 99214 OFFICE O/P EST MOD 30 MIN: CPT

## 2024-02-22 PROBLEM — R68.82 DECREASED LIBIDO: Status: ACTIVE | Noted: 2021-06-22

## 2024-02-22 PROBLEM — N48.9 ABNORMALITY OF PENIS: Status: ACTIVE | Noted: 2022-01-05

## 2024-02-22 PROBLEM — R39.11 HESITANCY OF MICTURITION: Status: ACTIVE | Noted: 2021-06-03

## 2024-02-22 PROBLEM — N50.89 EPIDIDYMAL MASS: Status: ACTIVE | Noted: 2018-12-17

## 2024-02-22 NOTE — REVIEW OF SYSTEMS
[Hesitancy] : urinary hesitancy [Nocturia] : nocturia [Penile Reddening] : penile reddening [Poor quality erections] : Poor quality erections [Convulsions] : convulsions [Anxiety] : anxiety [Erectile Dysfunction] : erectile dysfunction [Libido Decreased] : libido decreased [Negative] : Heme/Lymph [Feeling Poorly] : not feeling poorly [Feeling Tired] : not feeling tired [Eyesight Problems] : no eyesight problems [Loss Of Hearing] : no hearing loss [Nosebleeds] : no nosebleeds [Chest Pain] : no chest pain [Cough] : no cough [Wheezing] : no wheezing [Constipation] : no constipation [Diarrhea] : no diarrhea [Heartburn] : no heartburn [Incontinence] : no incontinence [Dysuria] : no dysuria [Testicular Pain] : no testicular pain [Depression] : no depression

## 2024-02-22 NOTE — ASSESSMENT
[FreeTextEntry1] : 12/17/2018: Mr. Hudson is a 32 year old male presenting with a potential right hydrocele that appeared about one week ago. Patient had a bilateral hydrocele operation in 2005. Since the surgery, patient didn't have any testicular pain. Wakes up 1-2 times during the night to urinate. Patient denies dysuria, gross hematuria, urgency, or incontinence. Notes that his sexual function is satisfactory. Takes LevETIRAcetam to control his seizures. Currently unemployed.  06/03/2021: Patient presents today for follow up. Has taken Cialis 20mg PRN for ED which works only sometimes. States that erections will get soft early. Additionally complains of urinary hesitancy.   06/22/2021: Patient presents today for a follow up. Has taken tadalafil 5 mg once daily however he has also taken 20 mg in addition to the 5 mg on some days that he wants to have sex. He feels libido is increased since taking tadalafil but still decreased overall. He is currently sexually active with one partner who has noticed an improvement. Urination is still delayed but much improved. He states he used to wait about 1 minute before voiding and now he waits about 25 seconds. Admits to having one episode of premature ejaculation in the past. +PMHx of epilepsy. Denies constipation, chest pains, or any new aches and pains.   01/05/2022: Patient presents today for follow up. Today complains of dry skin on his penis, cracking, and redness. No pain. Occured before any type of sexual activity. Has been using Lubriderm which has helped. Does eat meat and vegetable diet. Skin has been improving. Continues to take tadalafil 5mg once daily which has helped and has not needed to take additional tablets. Urination is good. Still has delayed urination but is better.   I recommend patient moisturize at least 3x a day for dry skin.  Continue tadalafil 5mg once daily for ED and urination.  The patient produced a urine sample which will be sent for urinalysis, urine cytology, and urine culture.  RTO in 1 year for follow up or sooner if new urinary symptoms develop.   07/27/2022:  presents today for a follow up. He reports that he has not been doing well. He was taking tadalafil 5 mg once daily but about 1.5 months ago it stopped working for him. He did run out but started cutting the 20 mg tablets in half. He still found this to be ineffective. His urination is difficult to initiate. He sometimes has to hum to get it to come out. Can be up to 1 minute before urination starts. He also reports erections are hard to maintain. C/o difficulty to get aroused. Lately he has been stressed since his girlfriend of 10 years had major surgery for herniated disc and is in the hospital. He noticed that his erections and libido decreased as his girlfriend's problems worsened. He c/o decreased sensation in the penile shaft. He denies any other body part besides the penis that he experiences lack of sensation. Denies pins and needles of the penis. Pt is epileptic, last seizure was in 2015. Had grand mal seizures, occurred mostly when he was sleeping. Is maintained on Keppra 3000 a day. Follows with a neurologist. Denies FHx of prostate cancer to his knowledge. Was not legally adopted but was raised by his aunt and uncle. Knows and speaks to his mother. Only met father once. Pt works for [employer]. Pt smokes marijuana fairly heavily.   PE: The patient is circumcised. Visible appearance is normal. Urethral meatus is normal. Mucosa within is normal. No exudate. He says that tactile sensation is decreased across 80% of proximal shaft, distal 20% of glans is normal. Scrotal skin is normal. Testes are non tender and without masses. Scrotal and testicle sensation are normal. No palpable varicoceles. Sensation in ilial and inguinal nerve in medial scrotum, inguinal canal, and thigh is normal.   The patient produced a urine sample which will be sent for urinalysis, urine cytology, and urine culture.  Blood work today includes androstenedione, A1C, CBC with diff, CMP, dehydroepiandrosterone serum, dehydroepiandrosterone -sulfate serum, dihydrotestosterone, estradiol, estrogen total, FSH, LH, progesterone, prolactin, SHBG, levetiracetam level, and TSH.  I explained that smoking marijuana effects fertility as well as libido. He states he has not been smoking that much lately due to his girlfriend being in the hospital. I strongly advised him to cut back on the amount he smokes.  Patient will schedule a telehealth visit in 2 weeks to discuss hormone levels.   08/23/2022:  presents today for a telehealth visit. He reports he is actually feeling better. He has been taking tadalafil 5 mg once daily which he has found to improve his urination. The delay in urination when he tries to initiate is shorter. If he feels the urge to go he is able to urinate as soon as he initiates it. He is still not really sexually active due to his wife's back problems, however she is improving and he anticipates being sexually active soon. He no longer has a supply of tadalafil 20 mg PRN.  Reviewed and discussed laboratory work of 7/27/2022 in detail. Progesterone was elevated at 0.3 ng/ml. Free testosterone was elevated at 7.6 pg/mL. Total testosterone was normal at 636. SHBG was elevated at 66.7 nmol/L. All other hormone levels were within normal range. UA was normal other than elevated specific gravity, patient advised to drink more water. Renewed tadalafil 5 mg once daily for urination as well as tadalafil 20 mg PRN for sex. I informed him that since he has tolerated 20 mg in the past, if he finds it is not adequate enough for erections, he can try taking 30 mg but to not exceed 30 mg without speaking to me.  Patient will have a telehealth visit in 2 months, sooner if clinically indicated.   05/17/2023: Mr. TRACY HUDSON presents today for an audio visual telehealth visit for which he gave permission for. The patient was located at home at [address] [place] [address] and I was located at my office in [place], [place]. Today the pt reports redness and a lot of dry skin on the penis. It is cleared up now after using a lot of cortisone cream. Whenever he got an erection it would begin to itch and crack. He does not have eczema anywhere else on his body. He did take a picture to show me the redness when it was occurring and emailed it to me. He reports warm water irritated it so he would clean it with cold water. He reports not being with any new partners or using any new soaps. His partner has not used any new lubricants. He does not use condoms. He reports it comes about pretty randomly and this latest episode lasted about 2 months and was very very itchy. He compared the itching to chicken pox. He denies any rough sexual activity or manual stimulation. He has not been sexually active in a while due to this. His urination is good. Denies any burning. Nocturia x2, unchanged. At this current time the skin looks normal and the erections are normal. He continues to take tadalafil 5 mg once daily and 20 mg PRN for improvement in erections. Sexual desire is good most of the time.   Upon reviewing the pictures, it appears this was eczema. I informed him that the next time this occurs, I want him to reach out right away and I can prescribe a stronger steroid cream but to use cortisone cream until he can get to me. Encouraged the use of gold bond eczema cream every day. In order to to be proactive against it coming back he should use the steroid cream for a month after it resolves. When he is having a flare up, he should use Benadryl for itching.  Patient will go to his nearest [place] to provide blood and urine. Reviewed last blood work done in July 2022.  Patient will have a telehealth visit in 3 months, sooner if clinically indicated.   02/07/2024: Mr. TRACY HUDSON presents today for a follow up. He reports that he felt something on his left testicle that he was worried about. Hx of bilateral hydrocele operation in 2005. He wants to ensure it is not another hydrocele. He denies any pain or discomfort. Denies FHx of prostate cancer. Patient takes tadalafil 5 mg once daily and 20 mg PRN. Denies PMhx of kidney stones but has a +FHx (maternal uncle). Drinks about 3 quarts of water a day.    PE: No right varicocele. No right inguinal adenopathy. No R inguinal hernia. Circumcised. Normal urethral meatus. No exudate. Penile shaft skin is normal. No fibrous plaque. Gentle natural curve to the left. No masses. Glans skin normal in color. No left varicocele on coughing. No left inguinal hernia or adenopathy. No intratesticular masses bilaterally. Normal sensitivity. Longoria clapper lie on the left. Palpable gubernaculum.   Patient reassured that there is nothing to worry about. If he experiences any pain or discomfort he should return promptly.   Patient will RTO in one year for reassessment, sooner if clinically indicated.    Preparation, in person office visit, and coordination of care took 30 minutes.

## 2024-02-22 NOTE — PHYSICAL EXAM
[Normal Appearance] : normal appearance [Well Groomed] : well groomed [General Appearance - In No Acute Distress] : no acute distress [Edema] : no peripheral edema [Respiration, Rhythm And Depth] : normal respiratory rhythm and effort [Exaggerated Use Of Accessory Muscles For Inspiration] : no accessory muscle use [Abdomen Soft] : soft [Abdomen Tenderness] : non-tender [Costovertebral Angle Tenderness] : no ~M costovertebral angle tenderness [Normal Station and Gait] : the gait and station were normal for the patient's age [] : no rash [No Focal Deficits] : no focal deficits [Oriented To Time, Place, And Person] : oriented to person, place, and time [Affect] : the affect was normal [Mood] : the mood was normal [de-identified] : No right varicocele. No right inguinal adenopathy. No R inguinal hernia. Circumcised. Normal urethral meatus. No exudate. Penile shaft skin is normal. No fibrous plaque. Gentle natural curve to the left. No masses. Glans skin normal in color. No left varicocele on coughing. No left inguinal hernia or adenopathy. No intratesticular masses bilaterally. Normal sensitivity. Longoria clapper lie on the left.  Palpable gubernaculum

## 2024-02-22 NOTE — HISTORY OF PRESENT ILLNESS
[FreeTextEntry1] : 12/17/2018: Mr. Hudson is a 32 year old male presenting with a potential right hydrocele that appeared about one week ago. Patient had a bilateral hydrocele operation in 2005. Since the surgery, patient didn't have any testicular pain. Wakes up 1-2 times during the night to urinate. Patient denies dysuria, gross hematuria, urgency, or incontinence. Notes that his sexual function is satisfactory. Takes LevETIRAcetam to control his seizures. Currently unemployed.  06/03/2021: Patient presents today for follow up. Has taken Cialis 20mg PRN for ED which works only sometimes. States that erections will get soft early. Additionally complains of urinary hesitancy.   06/22/2021: Patient presents today for a follow up. Has taken tadalafil 5 mg once daily however he has also taken 20 mg in addition to the 5 mg on some days that he wants to have sex. He feels libido is increased since taking tadalafil but still decreased overall. He is currently sexually active with one partner who has noticed an improvement. Urination is still delayed but much improved. He states he used to wait about 1 minute before voiding and now he waits about 25 seconds. Admits to having one episode of premature ejaculation in the past. +PMHx of epilepsy. Denies constipation, chest pains, or any new aches and pains.   01/05/2022: Patient presents today for follow up. Today complains of dry skin on his penis, cracking, and redness. No pain. Occured before any type of sexual activity. Has been using Lubriderm which has helped. Does eat meat and vegetable diet. Skin has been improving. Continues to take tadalafil 5mg once daily which has helped and has not needed to take additional tablets. Urination is good. Still has delayed urination but is better.   07/27/2022:  presents today for a follow up. He reports that he has not been doing well. He was taking tadalafil 5 mg once daily but about 1.5 months ago it stopped working for him. He did run out but started cutting the 20 mg tablets in half. He still found this to be ineffective. His urination is difficult to initiate. He sometimes has to hum to get it to come out. Can be up to 1 minute before urination starts. He also reports erections are hard to maintain. C/o difficulty to get aroused. Lately he has been stressed since his girlfriend of 10 years had major surgery for herniated disc and is in the hospital. He noticed that his erections and libido decreased as his girlfriend's problems worsened. He c/o decreased sensation in the penile shaft. He denies any other body part besides the penis that he experiences lack of sensation. Denies pins and needles of the penis. Pt is epileptic, last seizure was in 2015. Had grand mal seizures, occurred mostly when he was sleeping. Is maintained on Keppra 3000 a day. Follows with a neurologist. Denies FHx of prostate cancer to his knowledge. Was not legally adopted but was raised by his aunt and uncle. Knows and speaks to his mother. Only met father once. Pt works for [employer]. Pt smokes marijuana fairly heavily.   08/23/2022:  presents today for a telehealth visit. He reports he is actually feeling better. He has been taking tadalafil 5 mg once daily which he has found to improve his urination. The delay in urination when he tries to initiate is shorter. If he feels the urge to go he is able to urinate as soon as he initiates it. He is still not really sexually active due to his wife's back problems, however she is improving and he anticipates being sexually active soon. He no longer has a supply of tadalafil 20 mg PRN.  05/17/2023: Mr. TRACY HUDSON presents today for an audio visual telehealth visit for which he gave permission for. The patient was located at home at [address] [place] [address] and I was located at my office in [place], [place]. Today the pt reports redness and a lot of dry skin on the penis. It is cleared up now after using a lot of cortisone cream. Whenever he got an erection it would begin to itch and crack. He does not have eczema anywhere else on his body. He did take a picture to show me the redness when it was occurring and emailed it to me. He reports warm water irritated it so he would clean it with cold water. He reports not being with any new partners or using any new soaps. His partner has not used any new lubricants. He does not use condoms. He reports it comes about pretty randomly and this latest episode lasted about 2 months and was very very itchy. He compared the itching to chicken pox. He denies any rough sexual activity or manual stimulation. He has not been sexually active in a while due to this. His urination is good. Denies any burning. Nocturia x2, unchanged. At this current time the skin looks normal and the erections are normal. He continues to take tadalafil 5 mg once daily and 20 mg PRN for improvement in erections. Sexual desire is good most of the time.   02/07/2024: Mr. TRACY HUDSON presents today for a follow up. He reports that he felt something on his left testicle that he was worried about. Hx of bilateral hydrocele operation in 2005. He wants to ensure it is not another hydrocele. He denies any pain or discomfort. Denies FHx of prostate cancer. Patient takes tadalafil 5 mg once daily and 20 mg PRN.  Denies PMhx of kidney stones but has a +FHx (maternal uncle). Drinks about 3 quarts of water a day.

## 2024-02-22 NOTE — ADDENDUM
[FreeTextEntry1] : This note was authored by Talia Ospina working as a scribe for Dr.Gary Vargas. I, Dr. Clay Vargas have reviewed the content of this note and confirm it is true and accurate. I personally performed the history and physical examination and made all the decisions 02/07/2024

## 2024-06-24 ENCOUNTER — RX RENEWAL (OUTPATIENT)
Age: 34
End: 2024-06-24

## 2024-06-24 RX ORDER — LEVETIRACETAM 750 MG/1
750 TABLET, FILM COATED ORAL
Qty: 360 | Refills: 3 | Status: ACTIVE | COMMUNITY
Start: 2022-08-19 | End: 1900-01-01

## 2024-07-17 ENCOUNTER — APPOINTMENT (OUTPATIENT)
Dept: NEUROLOGY | Facility: CLINIC | Age: 34
End: 2024-07-17
Payer: COMMERCIAL

## 2024-07-17 VITALS
SYSTOLIC BLOOD PRESSURE: 122 MMHG | DIASTOLIC BLOOD PRESSURE: 75 MMHG | WEIGHT: 170 LBS | HEIGHT: 69 IN | HEART RATE: 59 BPM | BODY MASS INDEX: 25.18 KG/M2

## 2024-07-17 DIAGNOSIS — Z48.02 ENCOUNTER FOR REMOVAL OF SUTURES: ICD-10-CM

## 2024-07-17 DIAGNOSIS — Z00.00 ENCOUNTER FOR GENERAL ADULT MEDICAL EXAMINATION W/OUT ABNORMAL FINDINGS: ICD-10-CM

## 2024-07-17 DIAGNOSIS — R56.9 UNSPECIFIED CONVULSIONS: ICD-10-CM

## 2024-07-17 PROCEDURE — G2211 COMPLEX E/M VISIT ADD ON: CPT | Mod: NC

## 2024-07-17 PROCEDURE — 99214 OFFICE O/P EST MOD 30 MIN: CPT

## 2025-07-18 ENCOUNTER — NON-APPOINTMENT (OUTPATIENT)
Age: 35
End: 2025-07-18

## 2025-07-18 ENCOUNTER — APPOINTMENT (OUTPATIENT)
Dept: NEUROLOGY | Facility: CLINIC | Age: 35
End: 2025-07-18
Payer: COMMERCIAL

## 2025-07-18 VITALS
WEIGHT: 160 LBS | DIASTOLIC BLOOD PRESSURE: 79 MMHG | BODY MASS INDEX: 23.7 KG/M2 | HEIGHT: 69 IN | SYSTOLIC BLOOD PRESSURE: 128 MMHG | HEART RATE: 79 BPM

## 2025-07-18 PROCEDURE — G2211 COMPLEX E/M VISIT ADD ON: CPT | Mod: NC

## 2025-07-18 PROCEDURE — 99214 OFFICE O/P EST MOD 30 MIN: CPT
